# Patient Record
Sex: FEMALE | Race: WHITE | NOT HISPANIC OR LATINO | Employment: OTHER | URBAN - METROPOLITAN AREA
[De-identification: names, ages, dates, MRNs, and addresses within clinical notes are randomized per-mention and may not be internally consistent; named-entity substitution may affect disease eponyms.]

---

## 2017-03-15 PROBLEM — A60.00 GENITAL HERPES: Status: ACTIVE | Noted: 2017-03-15

## 2017-03-15 PROBLEM — Z78.0 POSTMENOPAUSAL: Status: ACTIVE | Noted: 2017-03-15

## 2017-03-20 ENCOUNTER — CLINICAL SUPPORT (OUTPATIENT)
Dept: FAMILY MEDICINE CLINIC | Facility: CLINIC | Age: 62
End: 2017-03-20

## 2017-03-20 DIAGNOSIS — Z78.0 POSTMENOPAUSAL: Primary | ICD-10-CM

## 2017-03-20 DIAGNOSIS — R29.890 HEIGHT LOSS: ICD-10-CM

## 2017-03-20 DIAGNOSIS — M85.80 OSTEOPENIA: ICD-10-CM

## 2017-03-20 PROCEDURE — 77080 DXA BONE DENSITY AXIAL: CPT | Performed by: FAMILY MEDICINE

## 2017-12-06 ENCOUNTER — OFFICE VISIT (OUTPATIENT)
Dept: FAMILY MEDICINE CLINIC | Facility: CLINIC | Age: 62
End: 2017-12-06

## 2017-12-06 VITALS
HEART RATE: 66 BPM | RESPIRATION RATE: 16 BRPM | BODY MASS INDEX: 24.11 KG/M2 | SYSTOLIC BLOOD PRESSURE: 122 MMHG | DIASTOLIC BLOOD PRESSURE: 82 MMHG | HEIGHT: 66 IN | WEIGHT: 150 LBS | TEMPERATURE: 98.3 F | OXYGEN SATURATION: 96 %

## 2017-12-06 DIAGNOSIS — R42 DIZZINESS: Primary | ICD-10-CM

## 2017-12-06 PROBLEM — N36.2 POLYP OF URETHRA: Status: ACTIVE | Noted: 2017-12-06

## 2017-12-06 PROCEDURE — 99213 OFFICE O/P EST LOW 20 MIN: CPT | Performed by: NURSE PRACTITIONER

## 2017-12-06 RX ORDER — ASPIRIN 81 MG/1
TABLET ORAL DAILY
COMMUNITY
Start: 2014-03-07 | End: 2020-07-22

## 2017-12-06 RX ORDER — METHYLPREDNISOLONE 4 MG/1
TABLET ORAL
Qty: 21 TABLET | Refills: 0 | Status: SHIPPED | OUTPATIENT
Start: 2017-12-06 | End: 2017-12-20

## 2017-12-06 RX ORDER — MECLIZINE HYDROCHLORIDE 25 MG/1
25 TABLET ORAL 3 TIMES DAILY PRN
Qty: 21 TABLET | Refills: 0 | Status: SHIPPED | OUTPATIENT
Start: 2017-12-06 | End: 2017-12-20

## 2017-12-06 NOTE — PROGRESS NOTES
Subjective   Sabiha Gonzalez is a 62 y.o. female.     History of Present Illness Sees her provider at work. Has labs annually. Lipids are normal.  [place]. Slept in trendelenburg position. Now when she lays on her left side or moves her eyes too quickly she gets dizzy. Has tried the eply maneuver and has not had any success. No auditory manifestations or nausea. The Eply maneuver does make her nauseated but does not stop the dizziness.  Last seen here 2 years ago for cpx and pap. States her insurance will not pay for an annual wellness visit.    The following portions of the patient's history were reviewed and updated as appropriate: allergies, current medications, past family history, past medical history, past social history, past surgical history and problem list.    Review of Systems   Constitutional: Negative for fatigue, fever and unexpected weight change.   HENT: Negative for congestion, hearing loss, nosebleeds, rhinorrhea, sore throat, trouble swallowing and voice change.    Eyes: Negative for pain, discharge, redness and visual disturbance.   Respiratory: Negative for cough, chest tightness, shortness of breath and wheezing.    Cardiovascular: Negative for chest pain, palpitations and leg swelling.   Gastrointestinal: Negative for abdominal distention, abdominal pain, anal bleeding, blood in stool, constipation, diarrhea, nausea and vomiting.   Endocrine: Negative for cold intolerance, heat intolerance, polydipsia, polyphagia and polyuria.   Genitourinary: Negative for dysuria, flank pain, frequency and hematuria.   Musculoskeletal: Negative for arthralgias, gait problem, joint swelling and myalgias.   Skin: Negative for color change and rash.   Neurological: Positive for dizziness. Negative for tremors, seizures, syncope, speech difficulty, weakness, numbness and headaches.        Vertigo   Hematological: Negative.    Psychiatric/Behavioral: Negative.         Objective   Physical Exam   Constitutional: She is oriented to person, place, and time. She appears well-developed and well-nourished. No distress.   HENT:   Head: Normocephalic and atraumatic.   Right Ear: Tympanic membrane and external ear normal.   Left Ear: Tympanic membrane and external ear normal.   Nose: Nose normal.   Mouth/Throat: Oropharynx is clear and moist. No oropharyngeal exudate.   Eyes: Conjunctivae are normal. Pupils are equal, round, and reactive to light. Right eye exhibits no discharge. Left eye exhibits no discharge. No scleral icterus.   Neck: Neck supple. No tracheal deviation present. No thyromegaly present.   Cardiovascular: Normal rate, regular rhythm and normal heart sounds.  Exam reveals no gallop and no friction rub.    No murmur heard.  Pulmonary/Chest: Effort normal and breath sounds normal. No respiratory distress. She has no wheezes.   Abdominal: Soft. Bowel sounds are normal. She exhibits no distension and no mass. There is no tenderness.   Musculoskeletal: She exhibits no edema or deformity.   Lymphadenopathy:     She has no cervical adenopathy.   Neurological: She is alert and oriented to person, place, and time. She has normal reflexes. She displays normal reflexes. No cranial nerve deficit. She exhibits normal muscle tone. Coordination normal.   No nystagmus   Skin: Skin is warm and dry. No rash noted. No erythema.   Psychiatric: She has a normal mood and affect. Her speech is normal and behavior is normal. Judgment and thought content normal.   Nursing note and vitals reviewed.      Assessment/Plan   Sabiha was seen today for dizziness.    Diagnoses and all orders for this visit:    Dizziness  -     MethylPREDNISolone (MEDROL, ROSLYN,) 4 MG tablet; Take as directed on package instructions.  -     meclizine (ANTIVERT) 25 MG tablet; Take 1 tablet by mouth 3 (Three) Times a Day As Needed for dizziness.    Trial of steroid and meclizine. If symptoms persist will refer to  ent.  Discussed need for annual breast and pelvic exam.   Obtain copy of labs from health screening at work.  Discussed the nature of the disease including, risks, complications, implications, management, safe and proper use of medications. Encouraged keeping scheduled follow up appointments with me and any other providers. Encouraged patient to have appointment for complete physical, fasting labs, appropriate screenings, and immunizations on an annual basis.  Follow up symptoms persist or worsen.

## 2017-12-20 ENCOUNTER — OFFICE VISIT (OUTPATIENT)
Dept: FAMILY MEDICINE CLINIC | Facility: CLINIC | Age: 62
End: 2017-12-20

## 2017-12-20 VITALS
TEMPERATURE: 98.7 F | DIASTOLIC BLOOD PRESSURE: 80 MMHG | HEIGHT: 55 IN | HEART RATE: 64 BPM | OXYGEN SATURATION: 99 % | SYSTOLIC BLOOD PRESSURE: 132 MMHG | BODY MASS INDEX: 34.71 KG/M2 | WEIGHT: 150 LBS | RESPIRATION RATE: 16 BRPM

## 2017-12-20 DIAGNOSIS — Z23 IMMUNIZATION DUE: ICD-10-CM

## 2017-12-20 DIAGNOSIS — R42 VERTIGO: Primary | ICD-10-CM

## 2017-12-20 PROCEDURE — 90715 TDAP VACCINE 7 YRS/> IM: CPT | Performed by: NURSE PRACTITIONER

## 2017-12-20 PROCEDURE — 90471 IMMUNIZATION ADMIN: CPT | Performed by: NURSE PRACTITIONER

## 2017-12-20 PROCEDURE — 99213 OFFICE O/P EST LOW 20 MIN: CPT | Performed by: NURSE PRACTITIONER

## 2017-12-20 NOTE — PROGRESS NOTES
Subjective   Sabiha Gonzalez is a 62 y.o. female.     History of Present Illness Here to follow up on vertigo. Symptoms resolved when she finished the steroids. No nausea or dizziness with movement.  We did not receive her labs or records from her work place medical provider. She does not want to repeat labs at this time. Needs a pelvic.    The following portions of the patient's history were reviewed and updated as appropriate: allergies, current medications, past family history, past medical history, past social history, past surgical history and problem list.    Review of Systems   Constitutional: Negative for fatigue, fever and unexpected weight change.   HENT: Negative for congestion, hearing loss, nosebleeds, rhinorrhea, sore throat, trouble swallowing and voice change.    Eyes: Negative for pain, discharge, redness and visual disturbance.   Respiratory: Negative for cough, chest tightness, shortness of breath and wheezing.    Cardiovascular: Negative for chest pain, palpitations and leg swelling.   Gastrointestinal: Negative for abdominal distention, abdominal pain, anal bleeding, blood in stool, constipation, diarrhea, nausea and vomiting.   Endocrine: Negative for cold intolerance, heat intolerance, polydipsia, polyphagia and polyuria.   Genitourinary: Negative for dysuria, flank pain, frequency and hematuria.   Musculoskeletal: Negative for arthralgias, gait problem, joint swelling and myalgias.   Skin: Negative for color change and rash.   Neurological: Negative for dizziness, tremors, seizures, syncope, speech difficulty, weakness, numbness and headaches.   Hematological: Negative.    Psychiatric/Behavioral: Negative.        Objective   Physical Exam   Constitutional: She is oriented to person, place, and time. She appears well-developed and well-nourished. No distress.   HENT:   Head: Normocephalic and atraumatic.   Right Ear: Tympanic membrane and external ear normal.   Left Ear: Tympanic membrane  and external ear normal.   Nose: Nose normal.   Mouth/Throat: Oropharynx is clear and moist. No oropharyngeal exudate.   Eyes: Conjunctivae are normal. Pupils are equal, round, and reactive to light. Right eye exhibits no discharge. Left eye exhibits no discharge. No scleral icterus.   Neck: Neck supple. No tracheal deviation present. No thyromegaly present.   Cardiovascular: Normal rate, regular rhythm and normal heart sounds.  Exam reveals no gallop and no friction rub.    No murmur heard.  Pulmonary/Chest: Effort normal and breath sounds normal. No respiratory distress. She has no wheezes.   Abdominal: Soft. Bowel sounds are normal. She exhibits no distension and no mass. There is no tenderness.   Musculoskeletal: She exhibits no edema or deformity.   Lymphadenopathy:     She has no cervical adenopathy.   Neurological: She is alert and oriented to person, place, and time. She displays normal reflexes. No cranial nerve deficit. She exhibits normal muscle tone. Coordination normal.   Skin: Skin is warm and dry. No rash noted. No erythema.   Psychiatric: She has a normal mood and affect. Her speech is normal and behavior is normal. Judgment and thought content normal.   Nursing note and vitals reviewed.    Sabiha was seen today for dizziness.    Diagnoses and all orders for this visit:    Vertigo    Immunization due  -     Tdap Vaccine Greater Than or Equal To 8yo IM      Follow up symptoms persist or worsen.  VIS provided.  Discussed the nature of the disease including, risks, complications, implications, management, safe and proper use of medications. Encouraged therapeutic lifestyle changes including low calorie diet with plenty of fruits and vegetables, daily exercise, medication compliance, and keeping scheduled follow up appointments with me and any other providers. Encouraged patient to have appointment for complete physical, fasting labs, appropriate screenings, and immunizations on an annual  basis.

## 2020-07-22 ENCOUNTER — OFFICE VISIT (OUTPATIENT)
Dept: FAMILY MEDICINE CLINIC | Facility: CLINIC | Age: 65
End: 2020-07-22

## 2020-07-22 VITALS
HEIGHT: 66 IN | RESPIRATION RATE: 16 BRPM | HEART RATE: 71 BPM | DIASTOLIC BLOOD PRESSURE: 79 MMHG | WEIGHT: 147 LBS | BODY MASS INDEX: 23.63 KG/M2 | SYSTOLIC BLOOD PRESSURE: 120 MMHG | TEMPERATURE: 97.1 F

## 2020-07-22 DIAGNOSIS — R79.9 ABNORMAL FINDING OF BLOOD CHEMISTRY: ICD-10-CM

## 2020-07-22 DIAGNOSIS — R03.0 ELEVATED BLOOD PRESSURE READING: ICD-10-CM

## 2020-07-22 DIAGNOSIS — E55.9 VITAMIN D DEFICIENCY: ICD-10-CM

## 2020-07-22 DIAGNOSIS — Z85.3 HISTORY OF BREAST CANCER: ICD-10-CM

## 2020-07-22 DIAGNOSIS — Z11.59 NEED FOR HEPATITIS C SCREENING TEST: ICD-10-CM

## 2020-07-22 DIAGNOSIS — Z00.00 MEDICARE ANNUAL WELLNESS VISIT, INITIAL: Primary | ICD-10-CM

## 2020-07-22 PROCEDURE — 93000 ELECTROCARDIOGRAM COMPLETE: CPT | Performed by: NURSE PRACTITIONER

## 2020-07-22 PROCEDURE — 80061 LIPID PANEL: CPT | Performed by: NURSE PRACTITIONER

## 2020-07-22 PROCEDURE — 85025 COMPLETE CBC W/AUTO DIFF WBC: CPT | Performed by: NURSE PRACTITIONER

## 2020-07-22 PROCEDURE — 84443 ASSAY THYROID STIM HORMONE: CPT | Performed by: NURSE PRACTITIONER

## 2020-07-22 PROCEDURE — 80053 COMPREHEN METABOLIC PANEL: CPT | Performed by: NURSE PRACTITIONER

## 2020-07-22 PROCEDURE — 86803 HEPATITIS C AB TEST: CPT | Performed by: NURSE PRACTITIONER

## 2020-07-22 PROCEDURE — G0402 INITIAL PREVENTIVE EXAM: HCPCS | Performed by: NURSE PRACTITIONER

## 2020-07-22 PROCEDURE — 82306 VITAMIN D 25 HYDROXY: CPT | Performed by: NURSE PRACTITIONER

## 2020-07-22 NOTE — PATIENT INSTRUCTIONS
Medicare Wellness  Personal Prevention Plan of Service     Date of Office Visit:  2020  Encounter Provider:  Emmy Velasco, JW, APRN  Place of Service:  [facility]  Patient Name: Sabiha Gonzalez  :  1955    As part of the Medicare Wellness portion of your visit today, we are providing you with this personalized preventive plan of services (PPPS). This plan is based upon recommendations of the [place] Preventive Services Task Force (USPSTF) and the Advisory Committee on Immunization Practices (ACIP).    This lists the preventive care services that should be considered, and provides dates of when you are due. Items listed as completed are up-to-date and do not require any further intervention.    Health Maintenance   Topic Date Due   • ZOSTER VACCINE (2 of 2) 2014   • HEPATITIS C SCREENING  03/15/2017   • MEDICARE ANNUAL WELLNESS  03/15/2017   • Pneumococcal Vaccine Once at 65 Years Old  2018   • INFLUENZA VACCINE  2020   • MAMMOGRAM  05/10/2021   • COLONOSCOPY  2027   • TDAP/TD VACCINES (2 - Td) 2027       Orders Placed This Encounter   Procedures   • Comprehensive Metabolic Panel   • Vitamin D 25 Hydroxy   • Hepatitis C Antibody   • Lipid Panel   • TSH   • CBC Auto Differential   • CBC & Differential     Order Specific Question:   Manual Differential     Answer:   No       Return if symptoms worsen or fail to improve.

## 2020-07-22 NOTE — PROGRESS NOTES
The ABCs of the Annual Wellness Visit  Initial Medicare Wellness Visit    Chief Complaint   Patient presents with   • Annual Exam   • Hypertension       Subjective   History of Present Illness:  Sabiha Gonzalez is a 64 y.o. female who presents for an Initial Medicare Wellness Visit.    HEALTH RISK ASSESSMENT    Recent Hospitalizations:  No hospitalization(s) within the last year.    Current Medical Providers:  Patient Care Team:  Emmy Velasco, JW, APRN as PCP - General    Smoking Status:  Social History     Tobacco Use   Smoking Status Former Smoker   Smokeless Tobacco Never Used       Alcohol Consumption:  Social History     Substance and Sexual Activity   Alcohol Use Yes    Comment: occasional        Depression Screen:   PHQ-2/PHQ-9 Depression Screening 7/22/2020   Little interest or pleasure in doing things 0   Feeling down, depressed, or hopeless 0   Total Score 0       Fall Risk Screen:  RUELADI Fall Risk Assessment has not been completed.    Health Habits and Functional and Cognitive Screening:  Functional & Cognitive Status 7/22/2020   Do you have difficulty preparing food and eating? No   Do you have difficulty bathing yourself, getting dressed or grooming yourself? No   Do you have difficulty using the toilet? No   Do you have difficulty moving around from place to place? No   Do you have trouble with steps or getting out of a bed or a chair? Yes   Current Diet Well Balanced Diet   Dental Exam Up to date   Eye Exam Up to date   Exercise (times per week) 7 times per week   Current Exercise Activities Include Walking   Do you need help using the phone?  No   Are you deaf or do you have serious difficulty hearing?  No   Do you need help with transportation? No   Do you need help shopping? No   Do you need help preparing meals?  No   Do you need help with housework?  No   Do you need help with laundry? No   Do you need help taking your medications? No   Do you need help managing money? No   Do you  ever drive or ride in a car without wearing a seat belt? No   Have you felt unusual stress, anger or loneliness in the last month? No   Who do you live with? Alone   If you need help, do you have trouble finding someone available to you? No   Have you been bothered in the last four weeks by sexual problems? No   Do you have difficulty concentrating, remembering or making decisions? Yes     ACE MINI III  What is the year: correct  What is the month of the year: correct  What is the date?: correct  Repeat address three times, only score third attempt: [address], [place]: 7  Verbal Fluency -- Animal Names (0-25): 22+  Clock Drawing: All Correct  Tell me what you remember about that name and address we were repeating at the beginnin  Total ACE Score - <25/30 strongly suggests cognitive impairment; <21/30 almost certainly shows dementia: 30       Does the patient have evidence of cognitive impairment? No    Asprin use counseling:Does not need ASA (and currently is not on it)    Age-appropriate Screening Schedule:  Refer to the list below for future screening recommendations based on patient's age, sex and/or medical conditions. Orders for these recommended tests are listed in the plan section. The patient has been provided with a written plan.    Health Maintenance   Topic Date Due   • ZOSTER VACCINE (2 of 2) 2014   • INFLUENZA VACCINE  2020   • MAMMOGRAM  05/10/2021   • COLONOSCOPY  2027   • TDAP/TD VACCINES (2 - Td) 2027          The following portions of the patient's history were reviewed and updated as appropriate: allergies, current medications, past family history, past medical history, past social history, past surgical history and problem list.    Outpatient Medications Prior to Visit   Medication Sig Dispense Refill   • Multiple Vitamin (MULTI VITAMIN) tablet Take  by mouth.     • aspirin 81 MG EC tablet Take  by mouth Daily.       No  facility-administered medications prior to visit.        Patient Active Problem List   Diagnosis   • Postmenopausal   • Genital herpes   • Polyp of urethra   • Breast cancer (CMS/HCC)   • Leg fracture, left       Advanced Care Planning:  ACP discussion was held with the patient during this visit. Patient does not have an advance directive, information provided.    Review of Systems   Constitutional: Negative for fatigue, fever and unexpected weight change.   HENT: Negative for congestion, hearing loss, nosebleeds, rhinorrhea, sore throat, trouble swallowing and voice change.    Eyes: Negative for pain, discharge, redness and visual disturbance.   Respiratory: Negative for cough, chest tightness, shortness of breath and wheezing.    Cardiovascular: Negative for chest pain, palpitations and leg swelling.   Gastrointestinal: Negative for abdominal distention, abdominal pain, anal bleeding, blood in stool, constipation, diarrhea, nausea and vomiting.   Endocrine: Negative for cold intolerance, heat intolerance, polydipsia, polyphagia and polyuria.   Genitourinary: Negative for dysuria, flank pain, frequency and hematuria.   Musculoskeletal: Positive for arthralgias. Negative for gait problem, joint swelling and myalgias.   Skin: Negative for color change and rash.   Neurological: Negative for dizziness, tremors, seizures, syncope, speech difficulty, weakness, numbness and headaches.   Hematological: Negative.    Psychiatric/Behavioral: Negative.        Compared to one year ago, the patient feels her physical health is the same.  Compared to one year ago, the patient feels her mental health is the same.    Reviewed chart for potential of high risk medication in the elderly: yes  Reviewed chart for potential of harmful drug interactions in the elderly:yes    Objective         Vitals:    07/22/20 0951 07/22/20 1320   BP: 120/79    Pulse: 71    Resp: 16    Temp: 97.1 °F (36.2 °C)    TempSrc: Temporal    Weight: 66.7 kg (147  "lb) 66.7 kg (147 lb)   Height: 65.8 cm (25.89\") 167.1 cm (65.8\")       Body mass index is 23.87 kg/m².  Discussed the patient's BMI with her. The BMI is in the acceptable range.    Physical Exam   Constitutional: She is oriented to person, place, and time. She appears well-developed and well-nourished. No distress.   HENT:   Head: Normocephalic and atraumatic.   Right Ear: Tympanic membrane and external ear normal.   Left Ear: Tympanic membrane and external ear normal.   Nose: Nose normal.   Mouth/Throat: Oropharynx is clear and moist. No oropharyngeal exudate.   Eyes: Pupils are equal, round, and reactive to light. Conjunctivae are normal. Right eye exhibits no discharge. Left eye exhibits no discharge. No scleral icterus.   Neck: Neck supple. No tracheal deviation present. No thyromegaly present.   Cardiovascular: Normal rate, regular rhythm and normal heart sounds. Exam reveals no gallop and no friction rub.   No murmur heard.  Pulmonary/Chest: Effort normal and breath sounds normal. No respiratory distress. She has no wheezes.   Abdominal: Soft. Bowel sounds are normal. She exhibits no distension and no mass. There is no tenderness.   Musculoskeletal: She exhibits no edema or deformity.   Lymphadenopathy:     She has no cervical adenopathy.   Neurological: She is alert and oriented to person, place, and time. Coordination normal.   Skin: Skin is warm and dry. Capillary refill takes less than 2 seconds. No rash noted. No erythema.   Psychiatric: She has a normal mood and affect. Her speech is normal and behavior is normal. Judgment and thought content normal.   Nursing note and vitals reviewed.          ECG 12 Lead  Date/Time: 7/22/2020 1:24 PM  Performed by: Emmy Velasco DNP, APRN  Authorized by: Emmy Velasco DNP, JAY   Comparison: not compared with previous ECG   Previous ECG: no previous ECG available  Rhythm: sinus bradycardia  Rate: bradycardic  BPM: 54  Conduction: conduction normal  ST " Segments: ST segments normal  QRS axis: normal    Clinical impression: abnormal EKG                  Assessment/Plan    Sabiha was seen today for annual exam and hypertension.    Diagnoses and all orders for this visit:    Medicare annual wellness visit, initial  -     Lipid Panel  -     TSH    Need for hepatitis C screening test  -     Hepatitis C Antibody    Elevated blood pressure reading  -     ECG 12 Lead    History of breast cancer  -     CBC & Differential  -     Comprehensive Metabolic Panel  -     CBC Auto Differential    Vitamin D deficiency  -     Vitamin D 25 Hydroxy    Abnormal finding of blood chemistry          Medicare Risks and Personalized Health Plan  CMS Preventative Services Quick Reference  Advance Directive Discussion  Breast Cancer/Mammogram Screening  Cardiovascular risk  Colon Cancer Screening  Dementia/Memory   Depression/Dysphoria  Diabetic Lab Screening   Immunizations Discussed/Encouraged (specific immunizations; none-she will bring record of previous immunizations )  Osteoprorosis Risk    The above risks/problems have been discussed with the patient.  Pertinent information has been shared with the patient in the After Visit Summary.  Follow up plans and orders are seen below in the Assessment/Plan Section.      Follow Up:  Return if symptoms worsen or fail to improve.     An After Visit Summary and PPPS were given to the patient.

## 2020-07-22 NOTE — PROGRESS NOTES
Subjective   Sabiha Gonzalez is a 64 y.o. female and is here for a comprehensive physical exam. The patient reports problems - recent orthopedic injury. Patient reports last physical date of 2017.  Has had some elevated blood pressures at other office visits, but it is always normal at home. No headache, chest pain or edema      Dr Francis Cooper-Oncology [facility]    Patient rates their health as good. Describes diet as Well Balanced Diet. Exercises left leg injury. Walks daily. Occasional pain. Employed retired. Lives with alone. Dental exam every 6 months-yes. Brushes teeth twice a day-yes. Vision exam in last 12 months-yes. Wears glasses.    Do you take any herbs or supplements that were not prescribed by a doctor? no  Are you taking aspirin daily? no  Family history of ovarian cancer? no  Family history of breast cancer? yes  FH of endometrial cancer? no  FH of cervical cancer? no  FH of colon cancer? no    Cancer Screening  Mammogram up-to-date?  yes  BMD up-to-date? yes  Colonoscopy up-to-date? yes      History:  LMP: No LMP recorded. Patient is postmenopausal.  Post menopausal.  Last pap date:   Abnormal pap? no  : 4  Para: 2  Method of birth control sexually active and no birth control needed    Immunization History  Tdap? yes  HPV? not applicable  Pneumonia? has had one  Shingles? yes   Hep A    The following portions of the patient's history were reviewed and updated as appropriate: allergies, current medications, past family history, past medical history, past social history, past surgical history and problem list.    Past Medical History:   Diagnosis Date   • Breast cancer (CMS/[facility])    • Contact dermatitis    • Genital herpes    • Urethral polyp        Family History   Problem Relation Age of Onset   • Heart disease Mother    • Diabetes Mother    • Breast cancer Mother    • Stroke Father    • Diabetes Brother    • Heart disease Brother        Past Surgical History:   Procedure Laterality  Date   • KNEE SURGERY Right    • MASTECTOMY     • TONSILLECTOMY         Social History     Socioeconomic History   • Marital status:      Spouse name: Not on file   • Number of children: Not on file   • Years of education: Not on file   • Highest education level: Not on file   Tobacco Use   • Smoking status: Former Smoker   • Smokeless tobacco: Never Used   Substance and Sexual Activity   • Alcohol use: Yes     Comment: occasional    • Drug use: No   • Sexual activity: Not Currently       Review of Systems  Do you have pain that bothers you in your daily life? yes  Review of Systems   Constitutional: Negative for fatigue, fever and unexpected weight change.   HENT: Negative for congestion, hearing loss, nosebleeds, rhinorrhea, sore throat, trouble swallowing and voice change.    Eyes: Negative for pain, discharge, redness and visual disturbance.   Respiratory: Negative for cough, chest tightness, shortness of breath and wheezing.    Cardiovascular: Positive for palpitations. Negative for chest pain and leg swelling.        Palpitations for a few seconds.   Gastrointestinal: Negative for abdominal distention, abdominal pain, anal bleeding, blood in stool, constipation, diarrhea, nausea and vomiting.   Endocrine: Negative for cold intolerance, heat intolerance, polydipsia, polyphagia and polyuria.   Genitourinary: Negative for dysuria, flank pain, frequency and hematuria.   Musculoskeletal: Positive for arthralgias, gait problem and joint swelling. Negative for myalgias.   Skin: Negative for color change and rash.        Has Derm appt next week   Neurological: Negative for dizziness, tremors, seizures, syncope, speech difficulty, weakness, numbness and headaches.        Vertigo in the past   Hematological: Negative.    Psychiatric/Behavioral: Negative.        Objective   Physical Exam   Constitutional: She is oriented to person, place, and time. She appears well-developed and well-nourished. No distress.    HENT:   Head: Normocephalic and atraumatic.   Right Ear: Tympanic membrane and external ear normal.   Left Ear: Tympanic membrane and external ear normal.   Nose: Nose normal.   Mouth/Throat: Oropharynx is clear and moist. No oropharyngeal exudate.   Eyes: Pupils are equal, round, and reactive to light. Conjunctivae are normal. Right eye exhibits no discharge. Left eye exhibits no discharge. No scleral icterus.   Neck: Neck supple. No tracheal deviation present. No thyromegaly present.   Cardiovascular: Normal rate, regular rhythm and normal heart sounds. Exam reveals no gallop and no friction rub.   No murmur heard.  Pulmonary/Chest: Effort normal and breath sounds normal. No respiratory distress. She has no wheezes.   Abdominal: Soft. Bowel sounds are normal. She exhibits no distension and no mass. There is no tenderness.   Musculoskeletal: She exhibits no edema or deformity.   Lymphadenopathy:     She has no cervical adenopathy.   Neurological: She is alert and oriented to person, place, and time. Coordination normal.   Skin: Skin is warm and dry. Capillary refill takes less than 2 seconds. No rash noted. No erythema.   Psychiatric: She has a normal mood and affect. Her speech is normal and behavior is normal. Judgment and thought content normal.   Nursing note and vitals reviewed.     Sabiha was seen today for annual exam and hypertension.    Diagnoses and all orders for this visit:    Medicare annual wellness visit, initial  -     Lipid Panel  -     TSH    Need for hepatitis C screening test  -     Hepatitis C Antibody    Elevated blood pressure reading  -     ECG 12 Lead    History of breast cancer  -     CBC & Differential  -     Comprehensive Metabolic Panel  -     CBC Auto Differential    Vitamin D deficiency  -     Vitamin D 25 Hydroxy    Abnormal finding of blood chemistry        1. Will follow up with labs.  2. Patient Counseling:  --Nutrition: Stressed importance of moderation in sodium/caffeine  intake, saturated fat and cholesterol, caloric balance, sufficient intake of fresh fruits, vegetables, fiber, calcium,--Exercise: Stressed the importance of exercise 5 times a week  --Substance Abuse: Discussed cessation/primary prevention of tobacco, alcohol, or other drug use; driving or other dangerous activities under the influence; availability of treatment for abuse.    --Injury prevention: Discussed safety belts, safety helmets, smoke detector, smoking near bedding or upholstery.   --Dental health: Discussed importance of regular tooth brushing, flossing, and dental visits.  --Immunizations reviewed.  --Discussed benefits of screening colonoscopy.  --Discussed benefits of screening mammogram.  --After hours service discussed with patient, and appropriate use of the ER.  --Discussed the importance of medication compliance, follow up with me and other health care providers, annual physical, fasting labs, and age appropriate screenings.    3. Discussed the patient's BMI with her.  The BMI is in the acceptable range  4. Follow up in one year  5. Discussed the nature of the disease including, risks, complications, implications, management, safe and proper use of medications. Encouraged therapeutic lifestyle changes including low calorie diet with plenty of fruits and vegetables, daily exercise, medication compliance, and keeping scheduled follow up appointments with me and any other providers. Encouraged patient to have appointment for complete physical, fasting labs, appropriate screenings, and immunizations on an annual basis.

## 2020-07-23 LAB
25(OH)D3 SERPL-MCNC: 45.4 NG/ML (ref 30–100)
ALBUMIN SERPL-MCNC: 4.3 G/DL (ref 3.5–5.2)
ALBUMIN/GLOB SERPL: 1.5 G/DL
ALP SERPL-CCNC: 50 U/L (ref 39–117)
ALT SERPL W P-5'-P-CCNC: 14 U/L (ref 1–33)
ANION GAP SERPL CALCULATED.3IONS-SCNC: 11.8 MMOL/L (ref 5–15)
AST SERPL-CCNC: 17 U/L (ref 1–32)
BASOPHILS # BLD AUTO: 0.04 10*3/MM3 (ref 0–0.2)
BASOPHILS NFR BLD AUTO: 0.6 % (ref 0–1.5)
BILIRUB SERPL-MCNC: 0.4 MG/DL (ref 0–1.2)
BUN SERPL-MCNC: 12 MG/DL (ref 8–23)
BUN/CREAT SERPL: 19.4 (ref 7–25)
CALCIUM SPEC-SCNC: 9.5 MG/DL (ref 8.6–10.5)
CHLORIDE SERPL-SCNC: 103 MMOL/L (ref 98–107)
CHOLEST SERPL-MCNC: 217 MG/DL (ref 0–200)
CO2 SERPL-SCNC: 21.2 MMOL/L (ref 22–29)
CREAT SERPL-MCNC: 0.62 MG/DL (ref 0.57–1)
DEPRECATED RDW RBC AUTO: 41.2 FL (ref 37–54)
EOSINOPHIL # BLD AUTO: 0.18 10*3/MM3 (ref 0–0.4)
EOSINOPHIL NFR BLD AUTO: 2.8 % (ref 0.3–6.2)
ERYTHROCYTE [DISTWIDTH] IN BLOOD BY AUTOMATED COUNT: 13.2 % (ref 12.3–15.4)
GFR SERPL CREATININE-BSD FRML MDRD: 96 ML/MIN/1.73
GLOBULIN UR ELPH-MCNC: 2.9 GM/DL
GLUCOSE SERPL-MCNC: 82 MG/DL (ref 65–99)
HCT VFR BLD AUTO: 40.1 % (ref 34–46.6)
HCV AB SER DONR QL: NORMAL
HDLC SERPL-MCNC: 97 MG/DL (ref 40–60)
HGB BLD-MCNC: 12.8 G/DL (ref 12–15.9)
IMM GRANULOCYTES # BLD AUTO: 0.02 10*3/MM3 (ref 0–0.05)
IMM GRANULOCYTES NFR BLD AUTO: 0.3 % (ref 0–0.5)
LDLC SERPL CALC-MCNC: 107 MG/DL (ref 0–100)
LDLC/HDLC SERPL: 1.1 {RATIO}
LYMPHOCYTES # BLD AUTO: 2.52 10*3/MM3 (ref 0.7–3.1)
LYMPHOCYTES NFR BLD AUTO: 39.2 % (ref 19.6–45.3)
MCH RBC QN AUTO: 26.9 PG (ref 26.6–33)
MCHC RBC AUTO-ENTMCNC: 31.9 G/DL (ref 31.5–35.7)
MCV RBC AUTO: 84.4 FL (ref 79–97)
MONOCYTES # BLD AUTO: 0.64 10*3/MM3 (ref 0.1–0.9)
MONOCYTES NFR BLD AUTO: 10 % (ref 5–12)
NEUTROPHILS NFR BLD AUTO: 3.03 10*3/MM3 (ref 1.7–7)
NEUTROPHILS NFR BLD AUTO: 47.1 % (ref 42.7–76)
NRBC BLD AUTO-RTO: 0 /100 WBC (ref 0–0.2)
PLATELET # BLD AUTO: 315 10*3/MM3 (ref 140–450)
PMV BLD AUTO: 9.5 FL (ref 6–12)
POTASSIUM SERPL-SCNC: 4.3 MMOL/L (ref 3.5–5.2)
PROT SERPL-MCNC: 7.2 G/DL (ref 6–8.5)
RBC # BLD AUTO: 4.75 10*6/MM3 (ref 3.77–5.28)
SODIUM SERPL-SCNC: 136 MMOL/L (ref 136–145)
TRIGL SERPL-MCNC: 67 MG/DL (ref 0–150)
TSH SERPL DL<=0.05 MIU/L-ACNC: 2.29 UIU/ML (ref 0.27–4.2)
VLDLC SERPL-MCNC: 13.4 MG/DL (ref 5–40)
WBC # BLD AUTO: 6.43 10*3/MM3 (ref 3.4–10.8)

## 2020-08-04 ENCOUNTER — TELEPHONE (OUTPATIENT)
Dept: FAMILY MEDICINE CLINIC | Facility: CLINIC | Age: 65
End: 2020-08-04

## 2020-08-04 NOTE — TELEPHONE ENCOUNTER
We changed her visit to a medicare wellness and now it's showing she doesn't have insurance. Can you take care of this?

## 2020-08-04 NOTE — TELEPHONE ENCOUNTER
PT CALLED TO REQUEST A CALL BACK REGARDING HER LAST APPT WITH PCP.    JERICHO WARE.  CALL BACK:[phone]

## 2020-08-04 NOTE — TELEPHONE ENCOUNTER
PATIENT CALLED AGAIN AND WANTED TO LEAVE A MESSAGE FOR RAFAELA.  VISIT ON July VISIT ISN'T BEING PAID, AND SHE NEEDS TO GET IT SORTED OUT.  THERE IS AN ISSUE WITH HOW THE VISIT WAS SCHEDULED AND/OR CODED.      PLEASE CONTACT PATIENT TO ADVISE.  CALLBACK [phone]

## 2021-02-12 ENCOUNTER — OFFICE VISIT (OUTPATIENT)
Dept: FAMILY MEDICINE CLINIC | Facility: CLINIC | Age: 66
End: 2021-02-12

## 2021-02-12 VITALS
RESPIRATION RATE: 22 BRPM | HEART RATE: 75 BPM | WEIGHT: 142 LBS | OXYGEN SATURATION: 99 % | BODY MASS INDEX: 23.66 KG/M2 | TEMPERATURE: 98.2 F | SYSTOLIC BLOOD PRESSURE: 124 MMHG | HEIGHT: 65 IN | DIASTOLIC BLOOD PRESSURE: 82 MMHG

## 2021-02-12 DIAGNOSIS — R51.9 ACUTE INTRACTABLE HEADACHE, UNSPECIFIED HEADACHE TYPE: Primary | ICD-10-CM

## 2021-02-12 DIAGNOSIS — Z85.3 PERSONAL HISTORY OF BREAST CANCER: ICD-10-CM

## 2021-02-12 PROBLEM — A60.00 GENITAL HERPES: Chronic | Status: ACTIVE | Noted: 2017-03-15

## 2021-02-12 PROCEDURE — 99213 OFFICE O/P EST LOW 20 MIN: CPT | Performed by: NURSE PRACTITIONER

## 2021-02-12 NOTE — PROGRESS NOTES
Follow Up Office Note     Patient Name: Sabiha Gonzalez  : 1955   MRN: [id number]     Chief Complaint:    Chief Complaint   Patient presents with   • Headache       History of Present Illness: Sabiha Gonzalez is a 65 y.o. female who presents today with complaint of left sided head pain x1 month.  Patient states that the pain began after she was working on a pneumatic tensioner for her storm door.  She states that while attempting to install the device she had been straining and exerting herself significantly.  She states the pain started at this time and has persisted since.  Patient has a history of breast cancer and is concerned that she could have a metastasis to the brain or have some type of aneurysm.  She is requesting diagnostic imaging regarding this.    Headache   This is a new (1 month ago) problem. The problem occurs daily. The problem has been unchanged. The pain is located in the left unilateral region. The pain does not radiate. The pain quality is not similar to prior headaches. The quality of the pain is described as throbbing. The pain is moderate. Pertinent negatives include no abdominal pain, abnormal behavior, back pain, blurred vision, coughing, dizziness, ear pain, eye pain, fever, hearing loss, loss of balance, nausea, neck pain, numbness, phonophobia, photophobia, rhinorrhea, scalp tenderness, seizures, sinus pressure, sore throat, swollen glands, tingling, tinnitus, visual change, vomiting or weakness. Nothing aggravates the symptoms. She has tried NSAIDs for the symptoms. The treatment provided mild relief.        Subjective      Review of Systems:   Review of Systems   Constitutional: Negative for activity change, appetite change, chills, diaphoresis, fatigue, fever and unexpected weight change.   HENT: Negative for congestion, dental problem, ear discharge, ear pain, facial swelling, hearing loss, nosebleeds, postnasal drip, rhinorrhea, sinus pressure, sinus pain,  "sore throat, tinnitus, trouble swallowing and voice change.    Eyes: Negative for blurred vision, photophobia and pain.   Respiratory: Negative for cough, chest tightness, shortness of breath and wheezing.    Cardiovascular: Negative for chest pain, palpitations and leg swelling.   Gastrointestinal: Negative for abdominal pain, diarrhea, nausea and vomiting.   Genitourinary: Negative.    Musculoskeletal: Negative for back pain, myalgias and neck pain.   Skin: Negative.    Neurological: Positive for headaches. Negative for dizziness, tingling, tremors, seizures, syncope, facial asymmetry, speech difficulty, weakness, light-headedness, numbness and loss of balance.        Past Medical History:   Past Medical History:   Diagnosis Date   • Breast cancer (CMS/HCC)    • Contact dermatitis    • Genital herpes    • Leg fracture, left    • Urethral polyp          Medications:     Current Outpatient Medications:   •  Multiple Vitamin (MULTI VITAMIN) tablet, Take  by mouth., Disp: , Rfl:     Allergies:   No Known Allergies      Objective     Physical Exam:  Vital Signs:   Vitals:    02/12/21 1105   BP: 124/82   BP Location: Left arm   Patient Position: Sitting   Cuff Size: Adult   Pulse: 75   Resp: 22   Temp: 98.2 °F (36.8 °C)   SpO2: 99%   Weight: 64.4 kg (142 lb)   Height: 165.1 cm (65\")   PainSc:   3     Body mass index is 23.63 kg/m².     Physical Exam  Vitals signs and nursing note reviewed.   Constitutional:       General: She is not in acute distress.     Appearance: Normal appearance. She is well-developed and well-groomed. She is not ill-appearing, toxic-appearing or diaphoretic.   HENT:      Head: Normocephalic and atraumatic.   Eyes:      Pupils: Pupils are equal, round, and reactive to light.   Cardiovascular:      Rate and Rhythm: Normal rate and regular rhythm.      Heart sounds: Normal heart sounds.   Pulmonary:      Effort: Pulmonary effort is normal. No respiratory distress.      Breath sounds: Normal breath " sounds. No stridor. No wheezing.   Skin:     General: Skin is warm and dry.   Neurological:      General: No focal deficit present.      Mental Status: She is alert and oriented to person, place, and time.      Cranial Nerves: Cranial nerves are intact.      Sensory: Sensation is intact.      Motor: Motor function is intact.      Coordination: Coordination is intact.      Gait: Gait is intact.   Psychiatric:         Mood and Affect: Mood normal.         Behavior: Behavior normal. Behavior is cooperative.         Thought Content: Thought content normal.         Judgment: Judgment normal.         Assessment / Plan      Assessment/Plan:   Diagnoses and all orders for this visit:    1. Acute intractable headache, unspecified headache type (Primary)  -     CT Head Without Contrast; Future    *Diagnostic imaging ordered, further recommendations after CT evaluation.     Discussed the nature of the medical condition(s) risks, complications, implications, management, safe and proper use of medications. Encouraged medication compliance, and keeping scheduled follow up appointments with me and any other providers.      I spent 20 minutes caring for Sabiha on this date of service. This time includes time spent by me in the following activities:preparing for the visit, reviewing tests, performing a medically appropriate examination and/or evaluation , counseling and educating the patient/family/caregiver, ordering medications, tests, or procedures and documenting information in the medical record.    RTC if symptoms fail to improve, to ER if symptoms worsen.      JAY Pinzon  [facility] [place]       Please note that portions of this note may have been completed with a voice recognition program. Efforts were made to edit the dictations, but occasionally words are mistranscribed.

## 2021-02-12 NOTE — PATIENT INSTRUCTIONS
General Headache Without Cause  A headache is pain or discomfort felt around the head or neck area. The specific cause of a headache may not be found. There are many causes and types of headaches. A few common ones are:  · Tension headaches.  · Migraine headaches.  · Cluster headaches.  · Chronic daily headaches.  Follow these instructions at home:  Watch your condition for any changes. Let your health care provider know about them. Take these steps to help with your condition:  Managing pain         · Take over-the-counter and prescription medicines only as told by your health care provider.  · Lie down in a dark, quiet room when you have a headache.  · If directed, put ice on your head and neck area:  ? Put ice in a plastic bag.  ? Place a towel between your skin and the bag.  ? Leave the ice on for 20 minutes, 2-3 times per day.  · If directed, apply heat to the affected area. Use the heat source that your health care provider recommends, such as a moist heat pack or a heating pad.  ? Place a towel between your skin and the heat source.  ? Leave the heat on for 20-30 minutes.  ? Remove the heat if your skin turns bright red. This is especially important if you are unable to feel pain, heat, or cold. You may have a greater risk of getting burned.  · Keep lights dim if bright lights bother you or make your headaches worse.  Eating and drinking  · Eat meals on a regular schedule.  · If you drink alcohol:  ? Limit how much you use to:  § 0-1 drink a day for women.  § 0-2 drinks a day for men.  ? Be aware of how much alcohol is in your drink. In the [place] one drink equals one 12 oz bottle of beer (355 mL), one 5 oz glass of wine (148 mL), or one 1½ oz glass of hard liquor (44 mL).  · Stop drinking caffeine, or decrease the amount of caffeine you drink.  General instructions    · Keep a headache journal to help find out what may trigger your headaches. For example, write down:  ? What you eat and drink.  ? How much  sleep you get.  ? Any change to your diet or medicines.  · Try massage or other relaxation techniques.  · Limit stress.  · Sit up straight, and do not tense your muscles.  · Do not use any products that contain nicotine or tobacco, such as cigarettes, e-cigarettes, and chewing tobacco. If you need help quitting, ask your health care provider.  · Exercise regularly as told by your health care provider.  · Sleep on a regular schedule. Get 7-9 hours of sleep each night, or the amount recommended by your health care provider.  · Keep all follow-up visits as told by your health care provider. This is important.  Contact a health care provider if:  · Your symptoms are not helped by medicine.  · You have a headache that is different from the usual headache.  · You have nausea or you vomit.  · You have a fever.  Get help right away if:  · Your headache becomes severe quickly.  · Your headache gets worse after moderate to intense physical activity.  · You have repeated vomiting.  · You have a stiff neck.  · You have a loss of vision.  · You have problems with speech.  · You have pain in the eye or ear.  · You have muscular weakness or loss of muscle control.  · You lose your balance or have trouble walking.  · You feel faint or pass out.  · You have confusion.  · You have a seizure.  Summary  · A headache is pain or discomfort felt around the head or neck area.  · There are many causes and types of headaches. In some cases, the cause may not be found.  · Keep a headache journal to help find out what may trigger your headaches. Watch your condition for any changes. Let your health care provider know about them.  · Contact a health care provider if you have a headache that is different from the usual headache, or if your symptoms are not helped by medicine.  · Get help right away if your headache becomes severe, you vomit, you have a loss of vision, you lose your balance, or you have a seizure.  This information is not  intended to replace advice given to you by your health care provider. Make sure you discuss any questions you have with your health care provider.  Document Revised: 07/08/2019 Document Reviewed: 07/08/2019  [employer] Patient Education © 2020 [employer]    Breast Cancer, Female    Breast cancer is a malignant growth of tissue (tumor) in the breast. Unlike noncancerous (benign) tumors, malignant tumors are cancerous and can spread to other parts of the body. The two most common types of breast cancer start in the milk ducts (ductal carcinoma) or in the lobules where milk is made in the breast (lobular carcinoma). Breast cancer is one of the most common types of cancer in women.  What are the causes?  The exact cause of female breast cancer is unknown.  What increases the risk?  The following factors may make you more likely to develop this condition:  · Being older than 55 years of age.  · Race and ethnicity.  women generally have an increased risk, but -American women are more likely to develop the disease before age 45.  · Having a family history of breast cancer.  · Having had breast cancer in the past.  · Having certain noncancerous conditions of the breast, such as dense breast tissue.  · Having the BRCA1 and BRCA2 genes.  · Having a history of radiation exposure.  · Obesity.  · Starting menopause after age 55.  · Starting your menstrual periods before age 12.  · Having never been pregnant or having your first child after age 30.  · Having never .  · Using hormone therapy after menopause.  · Using birth control pills.  · Drinking more than one alcoholic drink a day.  · Exposure to the drug CARLOS ALBERTO, which was given to pregnant women from the 1940s to the 1970s.  What are the signs or symptoms?  Symptoms of this condition include:  · A painless lump or thickening in your breast.  · Changes in the size or shape of your breast.  · Breast skin changes, such as puckering or dimpling.  · Nipple  abnormalities, such as scaling, crustiness, redness, or pulling in (retraction).  · Nipple discharge that is bloody or clear.  How is this diagnosed?  This condition may be diagnosed by:  · Taking your medical history and doing a physical exam. During the exam, your health care provider will feel the tissue around your breast and under your arms.  · Taking a sample of nipple discharge. The sample will be examined under a microscope.  · Performing imaging tests, such as breast X-rays (mammogram), breast ultrasound exams, or an MRI.  · Taking a tissue sample (biopsy) from the breast. The sample will be examined under a microscope to look for cancer cells.  · Taking a sample from the lymph nodes near the affected breast (sentinel node biopsy).  Your cancer will be staged to determine its severity and extent. Staging is a careful attempt to find out the size of the tumor, whether the cancer has spread, and if so, to what parts of the body. Staging also includes testing your tumor for certain receptors, such as estrogen, progesterone, and human epidermal growth factor receptor 2 (HER2). This will help your cancer care team decide on a treatment that will work best for you. You may need to have more tests to determine the stage of your cancer. Stages include the following:  · Stage 0--The tumor has not spread to other breast tissue.  · Stage I--The cancer is only found in the breast or may be in the lymph nodes. The tumor may be up to ¾ in (2 cm) wide.  · Stage II--The cancer has spread to nearby lymph nodes. The tumor may be up to 2 in (5 cm) wide.  · Stage III--The cancer has spread to more distant lymph nodes. The tumor may be larger than 2 in (5 cm) wide.  · Stage IV--The cancer has spread to other parts of the body, such as the bones, brain, liver, or lungs.  How is this treated?  Treatment for this condition depends on the type and stage of the breast cancer. It may be treated with:  · Surgery. This may involve  breast-conserving surgery (lumpectomy or partial mastectomy) in which only the part of the breast containing the cancer is removed. Some normal tissue surrounding this area may also be removed. In some cases, surgery may be done to remove the entire breast (mastectomy) and nipple. Lymph nodes may also be removed.  · Radiation therapy, which uses high-energy rays to kill cancer cells.  · Chemotherapy, which is the use of drugs to kill cancer cells.  · Hormone therapy, which involves taking medicine to adjust the hormone levels in your body. You may take medicine to decrease your estrogen levels. This can help stop cancer cells from growing.  · Targeted therapy, in which drugs are used to block the growth and spread of cancer cells. These drugs target a specific part of the cancer cell and usually cause fewer side effects than chemotherapy. Targeted therapy may be used alone or in combination with chemotherapy.  · A combination of surgery, radiation, chemotherapy, or hormone therapy may be needed to treat breast cancer.  Follow these instructions at home:  · Take over-the-counter and prescription medicines only as told by your health care provider.  · Eat a healthy diet. A healthy diet includes lots of fruits and vegetables, low-fat dairy products, lean meats, and fiber.  ? Make sure half your plate is filled with fruits or vegetables.  ? Choose high-fiber foods such as whole-grain breads and cereals.  · Consider joining a support group. This may help you learn to cope with the stress of having breast cancer.  · Talk to your health care team about exercise and physical activity. The right exercise program can:  ? Help prevent or reduce symptoms such as fatigue or depression.  ? Improve overall health and survival rates.  · Keep all follow-up visits as told by your health care provider. This is important.  Where to find more information  · American Cancer Society: www.cancer.org  · National Cancer [place]:  www.cancer.gov  Contact a health care provider if:  · You have a sudden increase in pain.  · You have any symptoms or changes that concern you.  · You lose weight without trying.  · You notice a new lump in either breast or under your arm.  · You develop swelling in either arm or hand.  · You have a fever.  · You notice new fatigue or weakness.  Get help right away if:  · You have chest pain or trouble breathing.  · You faint.  Summary  · Breast cancer is a malignant growth of tissue (tumor) in the breast.  · Your cancer will be staged to determine its severity and extent.  · Treatment for this condition depends on the type and stage of the breast cancer.  This information is not intended to replace advice given to you by your health care provider. Make sure you discuss any questions you have with your health care provider.  Document Revised: 11/30/2018 Document Reviewed: 08/13/2018  Elsevier Patient Education © 2020 [employer]

## 2021-02-22 ENCOUNTER — HOSPITAL ENCOUNTER (OUTPATIENT)
Dept: CT IMAGING | Facility: HOSPITAL | Age: 66
Discharge: HOME | End: 2021-02-22
Admitting: NURSE PRACTITIONER

## 2021-02-22 DIAGNOSIS — R51.9 ACUTE INTRACTABLE HEADACHE, UNSPECIFIED HEADACHE TYPE: ICD-10-CM

## 2021-02-22 PROCEDURE — 70450 CT HEAD/BRAIN W/O DYE: CPT

## 2021-05-06 PROCEDURE — U0005 INFEC AGEN DETEC AMPLI PROBE: HCPCS | Performed by: PHYSICIAN ASSISTANT

## 2021-05-06 PROCEDURE — U0004 COV-19 TEST NON-CDC HGH THRU: HCPCS | Performed by: PHYSICIAN ASSISTANT

## 2021-05-07 ENCOUNTER — TELEPHONE (OUTPATIENT)
Dept: URGENT CARE | Facility: CLINIC | Age: 66
End: 2021-05-07

## 2021-05-07 NOTE — TELEPHONE ENCOUNTER
Informed Pt of negative result. Pt verbalized understanding stated she started her antibiotic/steroid yesterday felt worst today. Informed Pt to give the medicationa 2-3 days to start working then if symptoms are not improving she can follow up with [facility] or PCP for further evaluation. Pt verbalized understanding no further questions at this time.

## 2021-10-19 ENCOUNTER — OFFICE VISIT (OUTPATIENT)
Dept: OBSTETRICS AND GYNECOLOGY | Facility: CLINIC | Age: 66
End: 2021-10-19

## 2021-10-19 VITALS
WEIGHT: 145 LBS | BODY MASS INDEX: 23.3 KG/M2 | SYSTOLIC BLOOD PRESSURE: 110 MMHG | HEIGHT: 66 IN | DIASTOLIC BLOOD PRESSURE: 78 MMHG

## 2021-10-19 DIAGNOSIS — Z91.89 AT RISK FOR OSTEOPOROSIS: ICD-10-CM

## 2021-10-19 DIAGNOSIS — Z01.419 PAP TEST, AS PART OF ROUTINE GYNECOLOGICAL EXAMINATION: ICD-10-CM

## 2021-10-19 DIAGNOSIS — Z13.820 OSTEOPOROSIS SCREENING: ICD-10-CM

## 2021-10-19 DIAGNOSIS — Z87.39 HISTORY OF OSTEOPENIA: Primary | ICD-10-CM

## 2021-10-19 DIAGNOSIS — Z78.0 POSTMENOPAUSAL: ICD-10-CM

## 2021-10-19 PROCEDURE — G0101 CA SCREEN;PELVIC/BREAST EXAM: HCPCS | Performed by: OBSTETRICS & GYNECOLOGY

## 2021-10-19 NOTE — PROGRESS NOTES
GYN Annual Exam     CC - Here for annual exam.        HPI  Sabiha Gonzalez is a 66 y.o. female, , who presents for annual well woman exam.  She is postmenopausal.  Patient denies vaginal bleeding. ..  Patient reports problems with: urinary incontinence, intermittent L breast pain-worse after exercise or excessive use of L arm. Since her last visit the patient was diagnosed with fracture knee . Partner Status: Marital Status: .  New Partners since last visit: no.      Additional OB/GYN History   Current contraception: contraceptive methods: Post menopausal status  Desires to: do not start contraception  On HRT? No  Last Pap :   Last Completed Pap Smear     This patient has no relevant Health Maintenance data.        History of abnormal Pap smear: no  Family history of uterine, colon, breast, or ovarian cancer: yes - breast CA in her mother  Performs monthly Self-Breast Exam: yes  Last mammogram: 2021. Done at [facility] [place].    Last Completed Mammogram          MAMMOGRAM (Yearly) Next due on 2021  SCANNED - MAMMO    2020  SCANNED - MAMMO    05/10/2019  SCANNED - MAMMO    2018  SCANNED - MAMMO    2017  SCANNED - MAMMO              Last colonoscopy:   Last Completed Colonoscopy          COLORECTAL CANCER SCREENING (COLONOSCOPY - Every 10 Years) Next due on 2017  COLONOSCOPY (Done)              Last DEXA: today-see report  Exercises Regularly: yes  Feelings of Anxiety or Depression: no      Tobacco Usage?: No   OB History        2    Para   2    Term   2            AB        Living   2       SAB        IAB        Ectopic        Molar        Multiple        Live Births   2                Health Maintenance   Topic Date Due   • ZOSTER VACCINE (2 of 2) 2014   • DXA SCAN  2019   • ANNUAL WELLNESS VISIT  2021   • PAP SMEAR  2021   • MAMMOGRAM  2022   • COLORECTAL CANCER SCREENING  2027   • TDAP/TD  "VACCINES (2 - Td or Tdap) 12/20/2027   • HEPATITIS C SCREENING  Completed   • COVID-19 Vaccine  Completed   • INFLUENZA VACCINE  Completed   • Pneumococcal Vaccine 65+  Completed       The additional following portions of the patient's history were reviewed and updated as appropriate: allergies, current medications, past family history, past medical history, past social history and past surgical history.    Review of Systems   Constitutional: Negative.    HENT: Negative.    Eyes: Negative.    Respiratory: Negative.    Cardiovascular: Negative.    Gastrointestinal: Negative.    Endocrine: Negative.    Genitourinary: Positive for breast pain and urinary incontinence.   Musculoskeletal: Negative.    Skin: Negative.    Allergic/Immunologic: Negative.    Neurological: Negative.    Hematological: Negative.    Psychiatric/Behavioral: Negative.        I have reviewed and agree with the HPI, ROS, and historical information as entered above. Kulwinder Espinosa MD    Objective   /78   Ht 167.6 cm (66\")   Wt 65.8 kg (145 lb)   Breastfeeding No   BMI 23.40 kg/m²     Physical Exam  Vitals and nursing note reviewed. Exam conducted with a chaperone present.   Constitutional:       Appearance: She is well-developed.   HENT:      Head: Normocephalic and atraumatic.   Neck:      Thyroid: No thyroid mass or thyromegaly.   Cardiovascular:      Rate and Rhythm: Normal rate and regular rhythm.      Heart sounds: No murmur heard.      Pulmonary:      Effort: Pulmonary effort is normal. No retractions.      Breath sounds: Normal breath sounds. No wheezing, rhonchi or rales.   Chest:      Chest wall: No mass or tenderness.   Breasts:      Right: Normal. No mass, nipple discharge, skin change or tenderness.      Left: Normal. No mass, nipple discharge, skin change or tenderness.       Abdominal:      General: Bowel sounds are normal.      Palpations: Abdomen is soft. Abdomen is not rigid. There is no mass.      Tenderness: There is no " abdominal tenderness. There is no guarding.      Hernia: No hernia is present. There is no hernia in the left inguinal area.   Genitourinary:     Labia:         Right: No rash, tenderness or lesion.         Left: No rash, tenderness or lesion.       Vagina: Normal. No vaginal discharge or lesions.      Cervix: No cervical motion tenderness, discharge, lesion or cervical bleeding.      Uterus: Normal. Not enlarged, not fixed and not tender.       Adnexa:         Right: No mass or tenderness.          Left: No mass or tenderness.        Rectum: No external hemorrhoid.   Musculoskeletal:      Cervical back: Normal range of motion. No muscular tenderness.   Neurological:      Mental Status: She is alert and oriented to person, place, and time.   Psychiatric:         Behavior: Behavior normal.            Assessment and Plan    Problem List Items Addressed This Visit        Genitourinary and Reproductive     Postmenopausal    Relevant Orders    DEXA Bone Density Axial      Other Visit Diagnoses     History of osteopenia    -  Primary    Relevant Orders    DEXA Bone Density Axial    Osteoporosis screening        Relevant Orders    DEXA Bone Density Axial    At risk for osteoporosis        Relevant Orders    DEXA Bone Density Axial    Pap test, as part of routine gynecological examination        Relevant Orders    Pap IG, Rfx HPV ASCU          1. GYN annual well woman exam.   2. Reviewed monthly self breast exams.  Instructed to call with lumps, pain, or breast discharge.  Yearly mammograms ordered.  3. Recommended use of Vitamin D and getting adequate calcium in her diet. (1500mg)  4. Osteoporosis screening ordered today.  5. Reviewed exercise as a preventative health measures.   6. Symptoms of menopausal transition reviewed with patient.   7. RTC in 1 year or PRN with problems.  8. Other: Hips mildly osteopenic and we will repeat BDS 2 years  9. No follow-ups on file.     Kulwinder Espinosa MD  10/19/2021

## 2021-10-27 DIAGNOSIS — Z01.419 PAP TEST, AS PART OF ROUTINE GYNECOLOGICAL EXAMINATION: ICD-10-CM

## 2022-04-11 ENCOUNTER — TELEPHONE (OUTPATIENT)
Dept: FAMILY MEDICINE CLINIC | Facility: CLINIC | Age: 67
End: 2022-04-11

## 2022-04-12 NOTE — TELEPHONE ENCOUNTER
Caller: Sabiha Gonzalez    Relationship: Self    Best call back number: [phone] (H)    What orders are you requesting (i.e. lab or imaging): ECO AND HOLTER MONITOR     In what timeframe would the patient need to come in: ASAP     Where will you receive your lab/imaging services: WITH IN [facility]     Additional notes: PATIENT WAS SEEN IN THE ER IN [facility] ER- PATIENT WAS HAVING HEART ARRHYTHMIA AND SHE WAS HAVING PVC AND PAC. THE ER PROVIDER WANTED THE PATIENT TO GET A ECO AND A HALTER MONITOR.         
Pt stated she already spoke to someone and they informed her that she need mukul appointment   
Yes

## 2022-04-14 ENCOUNTER — HOSPITAL ENCOUNTER (OUTPATIENT)
Dept: CARDIOLOGY | Facility: HOSPITAL | Age: 67
Discharge: HOME | End: 2022-04-14

## 2022-04-14 ENCOUNTER — OFFICE VISIT (OUTPATIENT)
Dept: CARDIOLOGY | Facility: HOSPITAL | Age: 67
End: 2022-04-14

## 2022-04-14 ENCOUNTER — OFFICE VISIT (OUTPATIENT)
Dept: FAMILY MEDICINE CLINIC | Facility: CLINIC | Age: 67
End: 2022-04-14

## 2022-04-14 VITALS
DIASTOLIC BLOOD PRESSURE: 78 MMHG | HEART RATE: 84 BPM | HEIGHT: 66 IN | OXYGEN SATURATION: 98 % | WEIGHT: 138 LBS | RESPIRATION RATE: 20 BRPM | TEMPERATURE: 97 F | SYSTOLIC BLOOD PRESSURE: 110 MMHG | BODY MASS INDEX: 22.18 KG/M2

## 2022-04-14 VITALS
WEIGHT: 148 LBS | BODY MASS INDEX: 23.78 KG/M2 | DIASTOLIC BLOOD PRESSURE: 71 MMHG | SYSTOLIC BLOOD PRESSURE: 133 MMHG | HEART RATE: 81 BPM | RESPIRATION RATE: 16 BRPM | HEIGHT: 66 IN | TEMPERATURE: 97.2 F | OXYGEN SATURATION: 97 %

## 2022-04-14 DIAGNOSIS — I49.8 SINUS ARRHYTHMIA: ICD-10-CM

## 2022-04-14 DIAGNOSIS — I49.1 PAC (PREMATURE ATRIAL CONTRACTION): ICD-10-CM

## 2022-04-14 DIAGNOSIS — R40.4 EPISODE OF ALTERED CONSCIOUSNESS: ICD-10-CM

## 2022-04-14 DIAGNOSIS — I45.10 RIGHT BUNDLE BRANCH BLOCK: ICD-10-CM

## 2022-04-14 DIAGNOSIS — R00.2 PALPITATIONS: ICD-10-CM

## 2022-04-14 DIAGNOSIS — I49.3 PVC (PREMATURE VENTRICULAR CONTRACTION): ICD-10-CM

## 2022-04-14 DIAGNOSIS — R00.2 PALPITATIONS: Primary | ICD-10-CM

## 2022-04-14 PROCEDURE — 99213 OFFICE O/P EST LOW 20 MIN: CPT | Performed by: NURSE PRACTITIONER

## 2022-04-14 PROCEDURE — 99214 OFFICE O/P EST MOD 30 MIN: CPT | Performed by: NURSE PRACTITIONER

## 2022-04-14 RX ORDER — METOPROLOL SUCCINATE 25 MG/1
25 TABLET, EXTENDED RELEASE ORAL DAILY
Qty: 30 TABLET | Refills: 0 | Status: SHIPPED | OUTPATIENT
Start: 2022-04-14 | End: 2022-05-10 | Stop reason: SDUPTHER

## 2022-04-14 NOTE — PATIENT INSTRUCTIONS
- Office will call to schedule testing    - Office will call or send message with testing results    -Cardiology will call to schedule an appointment

## 2022-04-14 NOTE — PROGRESS NOTES
Follow Up Office Note     Patient Name: Sabiha Gonzalez  : 1955   MRN: [id number]     Chief Complaint:    Chief Complaint   Patient presents with   • Irregular Heart Beat       History of Present Illness: Sabiha Gonzalez is a 66 y.o. female who presents today for new onset of heart palpitations.  Patient was on a trip to [place] over the weekend when she began having heart palpitations.   She states that she also had a feeling of something being off or not right.  She states that she felt somewhat lightheaded, but did not have any syncopal episodes or near syncope. Patient went to an emergency room on Kenji, April 10 in [place]. She has a copy of her ECG with her today as well as her labs which I have personally reviewed.   She denies chest pain, shortness of breath or weakness.  Patient denies any new medications or supplements.  She has not changed her caffeine intake (states she drinks 2 cups of coffee in the morning) and denies taking any Sudafed-containing cold products recently.  She can identify no precipitating events, but states she notices the palpitations more when she is lying down at night.  Patient has a brother with atrial fibrillation.      Subjective      Review of Systems:   Review of Systems   Constitutional: Negative for activity change, appetite change, chills, diaphoresis, fatigue and unexpected weight change.   Respiratory: Negative for cough, chest tightness, shortness of breath, wheezing and stridor.    Cardiovascular: Positive for palpitations. Negative for chest pain and leg swelling.   Gastrointestinal: Negative.    Neurological: Negative.         Past Medical History:   Past Medical History:   Diagnosis Date   • Breast cancer (HCC)    • Breast cancer (HCC)     12 years ago R breast   • Contact dermatitis    • Genital herpes    • Leg fracture, left    • Osteopenia    • Urethral polyp          Medications:     Current Outpatient Medications:   •   "Multiple Vitamin (MULTI VITAMIN) tablet, Take  by mouth., Disp: , Rfl:   •  Nutritional Supplements (VITAMIN D BOOSTER PO), Take  by mouth., Disp: , Rfl:     Allergies:   No Known Allergies      Objective     Physical Exam:  Vital Signs:   Vitals:    04/14/22 0921   BP: 110/78   Pulse: 84   Resp: 20   Temp: 97 °F (36.1 °C)   SpO2: 98%   Weight: 62.6 kg (138 lb)   Height: 167.6 cm (66\")   PainSc: 0-No pain     Body mass index is 22.27 kg/m².     Physical Exam  Vitals and nursing note reviewed.   Constitutional:       General: She is not in acute distress.     Appearance: Normal appearance. She is well-developed. She is not ill-appearing, toxic-appearing or diaphoretic.   HENT:      Head: Normocephalic and atraumatic.   Neck:      Vascular: No carotid bruit.   Cardiovascular:      Rate and Rhythm: Normal rate. Rhythm irregular.      Heart sounds: Normal heart sounds, S1 normal and S2 normal. No murmur heard.  Pulmonary:      Effort: Pulmonary effort is normal. No respiratory distress.      Breath sounds: Normal breath sounds. No stridor. No wheezing.   Musculoskeletal:      Right lower leg: No edema.      Left lower leg: No edema.   Skin:     General: Skin is warm and dry.   Neurological:      General: No focal deficit present.      Mental Status: She is alert and oriented to person, place, and time.   Psychiatric:         Mood and Affect: Mood normal.         Behavior: Behavior normal. Behavior is cooperative.         Thought Content: Thought content normal.         Judgment: Judgment normal.         Assessment / Plan      Assessment/Plan:   Diagnoses and all orders for this visit:    1. Palpitations (Primary)  -     Ambulatory Referral to [facility] [place] [facility] [place]    2. Right bundle branch block  -     Ambulatory Referral to [facility] [place] [facility] [place]    3. PVC (premature ventricular contraction)  -     Ambulatory Referral to [facility] [place] - [place]    4. Sinus " arrhythmia  -     Ambulatory Referral to [facility] [place] [facility] [place]    I personally contacted [facility] and was able to get patient an appointment for 11 AM this morning.  Patient verbalized understanding and agreement with treatment plan.      Follow Up:   PRN and at next scheduled appointment(s) with PCP.    Discussed the nature of the medical condition(s) risks, complications, implications, management, safe and proper use of medications. Encouraged medication compliance, and keeping scheduled follow up appointments with me and any other providers.      I spent 20 minutes caring for Sabiha on this date of service. This time includes time spent by me in the following activities:preparing for the visit, reviewing tests, performing a medically appropriate examination and/or evaluation , counseling and educating the patient/family/caregiver, referring and communicating with other health care professionals , documenting information in the medical record and care coordination.    RTC if symptoms fail to improve, to ER if symptoms worsen.      JAY Pinzon  [facility] [place]

## 2022-04-14 NOTE — PROGRESS NOTES
[facility] Heart Monitor Documentation    Sabiha Gonzalez  1955  [id number]  04/14/22      [] ZIO XT Patch  Model [id number] Prescribed for  Days    · Serial Number: (N + 9 Digits) N   · Apply-By Date on Box:   · USPS Tracking Number:   · USPS Tracking        [x] [employer] BodyGuardian MINI PLUS Mobile Cardiac Telemetry  Model [id number] Prescribed for 30 Days    · Serial Number: (BGM + 7 Digits) [id number]  · Shipped-By Date on Box: 04/07/2022  · UPS Tracking Number: [id number]  · UPS Tracking      [] [employer]ice BodyGuardian MINI Holter Monitor  Model BGMINIEL Prescribed for  Days    · Serial Number: (7 Digits)   · Shipped-By Date on Box:   · UPS Tracking Number: [id number]  · UPS Tracking        This monitor was applied to the patient's chest and checked for proper functioning.  Ms. Sabiha Gonzalez was instructed in the proper use of this monitor.  She was given the opportunity to ask questions and left the office with the device 's instruction manual.    Shala Maldonado, [facility], 12:19 EDT, 04/14/22                  [facility] 8[facility]

## 2022-04-14 NOTE — PROGRESS NOTES
"Chief Complaint  Establish Care (Right bundle branch block)    Subjective      History of Present Illness {CC  Problem List  Visit  Diagnosis   Encounters  Notes  Medications  Labs  Result Review Imaging  Media :23}     Sabiha Gonzalez, 66 y.o. female with history of breast cancer presents to [facility] Heart and Valve clinic for Establish Care (Right bundle branch block).  Very pleasant retired nurse.    Patient presents today upon referral from PCP JAY Pinzon. Patient was recently evaluated at a hospital in [place] that revealed incomplete RBBB and PVCs/PACs were noted during monitoring.    Patient presents today noting intermittent palpitation symptoms for the past year; symptoms have worsened in the past 2 weeks. No previous cardiac/arrhythmia history.  Does note recent Moderna COVID-19 booster (4th shot) a few weeks ago. She notes palpitation symptoms approximately 1 hour in duration. Described heartbeat as skipped and thumping heartbeat.  She denies anginal pain with exercise, dyspnea, lightheadedness, and syncope.  Caffeine intake is 2 cups of coffee per day, stays well hydrated. Stress echo possibly 14-15 years ago.  Outside records reviewed from evaluation [place] reveal ECG with right bundle branch block, PAC/PVC.        Objective     Vital Signs:   Vitals:    04/14/22 1118 04/14/22 1120   BP: 125/67 133/71   BP Location: Left arm Left arm   Patient Position: Standing Sitting   Cuff Size: Adult Adult   Pulse: 89 81   Resp:  16   Temp: 97.2 °F (36.2 °C) 97.2 °F (36.2 °C)   TempSrc: Temporal Temporal   SpO2: 97% 97%   Weight:  67.1 kg (148 lb)   Height:  167.6 cm (66\")     Body mass index is 23.89 kg/m².  Physical Exam  Vitals and nursing note reviewed.   Constitutional:       Appearance: Normal appearance.   HENT:      Head: Normocephalic.   Eyes:      Extraocular Movements: Extraocular movements intact.   Neck:      Vascular: No carotid bruit.   Cardiovascular: "      Rate and Rhythm: Normal rate and regular rhythm.      Pulses: Normal pulses.      Heart sounds: Normal heart sounds, S1 normal and S2 normal. No murmur heard.  Pulmonary:      Effort: Pulmonary effort is normal. No respiratory distress.      Breath sounds: Normal breath sounds.   Musculoskeletal:      Cervical back: Neck supple.      Right lower leg: No edema.      Left lower leg: No edema.   Skin:     General: Skin is warm and dry.   Neurological:      General: No focal deficit present.      Mental Status: She is alert.   Psychiatric:         Mood and Affect: Mood normal.         Behavior: Behavior normal.         Thought Content: Thought content normal.        Data Reviewed:{ Labs  Result Review  Imaging  Med Tab  Media :23}     SCANNED EKG (04/10/2022)  TSH (07/22/2020 10:48)  Lipid Panel (07/22/2020 10:48)  Comprehensive Metabolic Panel (07/22/2020 10:48)  CBC & Differential (07/22/2020 10:48)      Assessment/Plan   Assessment and Plan {CC Problem List  Visit Diagnosis  ROS  Review (Popup)  Health Maintenance  Quality  BestPractice  Medications  SmartSets  SnapShot Encounters  Media :23}     1. Palpitations  -Worsened palpitation symptoms prompted recent emergency department evaluation in [place].  Work-up revealed ECG/telemetry monitoring with PACs/PVC and incomplete right bundle branch block.  Given her sinus arrhythmia/PACs will complete MCOT for close arrhythmia monitoring.  Echocardiogram for structural/functional evaluation  -Outside records reviewed with normal electrolytes/CBC during work-up  - Adult Transthoracic Echo Complete W/ Cont if Necessary Per Protocol; Future  - Mobile Cardiac Outpatient Telemetry; Future  - metoprolol succinate XL (TOPROL-XL) 25 MG 24 hr tablet; Take 1 tablet by mouth Daily.  Dispense: 30 tablet; Refill: 0    2. PAC (premature atrial contraction)/PVC (premature ventricular contraction)  -Evident on outside record telemetry/ECG.  - Adult  Transthoracic Echo Complete W/ Cont if Necessary Per Protocol; Future  - Mobile Cardiac Outpatient Telemetry; Future  - metoprolol succinate XL (TOPROL-XL) 25 MG 24 hr tablet; Take 1 tablet by mouth Daily.  Dispense: 30 tablet; Refill: 0  - Ambulatory Referral to Cardiology      Follow Up {Instructions Charge Capture  Follow-up Communications :23}     Return in about 6 weeks (around 5/26/2022) for Office follow-up, Monitor results, Results follow-up.      Patient was given instructions and counseling regarding her condition or for health maintenance advice. Please see specific information pulled into the AVS if appropriate.  Patient was instructed to call the Heart and Valve Center with any questions, concerns, or worsening symptoms.    Dictated Utilizing Dragon Dictation   Please note that portions of this note were completed with a voice recognition program.   Part of this note may be an electronic transcription/translation of spoken language to printed text using the Dragon Dictation System.

## 2022-04-18 ENCOUNTER — HOSPITAL ENCOUNTER (OUTPATIENT)
Dept: CARDIOLOGY | Facility: HOSPITAL | Age: 67
Discharge: HOME | End: 2022-04-18
Admitting: NURSE PRACTITIONER

## 2022-04-18 VITALS — WEIGHT: 147.93 LBS | HEIGHT: 66 IN | BODY MASS INDEX: 23.77 KG/M2

## 2022-04-18 DIAGNOSIS — I49.1 PAC (PREMATURE ATRIAL CONTRACTION): ICD-10-CM

## 2022-04-18 DIAGNOSIS — R00.2 PALPITATIONS: ICD-10-CM

## 2022-04-18 DIAGNOSIS — I49.3 PVC (PREMATURE VENTRICULAR CONTRACTION): ICD-10-CM

## 2022-04-18 LAB
BH CV ECHO MEAS - AI DEC SLOPE: 159.7 CM/SEC^2
BH CV ECHO MEAS - AI MAX PG: 61.5 MMHG
BH CV ECHO MEAS - AI MAX VEL: 391.5 CM/SEC
BH CV ECHO MEAS - AI P1/2T: 718.2 MSEC
BH CV ECHO MEAS - AO MAX PG (FULL): 1.9 MMHG
BH CV ECHO MEAS - AO MAX PG: 6.2 MMHG
BH CV ECHO MEAS - AO MEAN PG (FULL): 1.5 MMHG
BH CV ECHO MEAS - AO MEAN PG: 3.5 MMHG
BH CV ECHO MEAS - AO ROOT AREA (BSA CORRECTED): 1.7
BH CV ECHO MEAS - AO ROOT AREA: 7 CM^2
BH CV ECHO MEAS - AO ROOT DIAM: 3 CM
BH CV ECHO MEAS - AO V2 MAX: 124.2 CM/SEC
BH CV ECHO MEAS - AO V2 MEAN: 87.3 CM/SEC
BH CV ECHO MEAS - AO V2 VTI: 25.8 CM
BH CV ECHO MEAS - ASC AORTA: 3 CM
BH CV ECHO MEAS - AVA(I,A): 2.4 CM^2
BH CV ECHO MEAS - AVA(I,D): 2.4 CM^2
BH CV ECHO MEAS - AVA(V,A): 2.6 CM^2
BH CV ECHO MEAS - AVA(V,D): 2.6 CM^2
BH CV ECHO MEAS - BSA(HAYCOCK): 1.8 M^2
BH CV ECHO MEAS - BSA: 1.8 M^2
BH CV ECHO MEAS - BZI_BMI: 23.9 KILOGRAMS/M^2
BH CV ECHO MEAS - BZI_METRIC_HEIGHT: 167.6 CM
BH CV ECHO MEAS - BZI_METRIC_WEIGHT: 67.1 KG
BH CV ECHO MEAS - EDV(CUBED): 124.7 ML
BH CV ECHO MEAS - EDV(MOD-SP2): 80 ML
BH CV ECHO MEAS - EDV(MOD-SP4): 102 ML
BH CV ECHO MEAS - EDV(TEICH): 118.1 ML
BH CV ECHO MEAS - EF(CUBED): 65.3 %
BH CV ECHO MEAS - EF(MOD-BP): 60 %
BH CV ECHO MEAS - EF(MOD-SP2): 58.8 %
BH CV ECHO MEAS - EF(MOD-SP4): 57.8 %
BH CV ECHO MEAS - EF(TEICH): 56.6 %
BH CV ECHO MEAS - ESV(CUBED): 43.2 ML
BH CV ECHO MEAS - ESV(MOD-SP2): 33 ML
BH CV ECHO MEAS - ESV(MOD-SP4): 43 ML
BH CV ECHO MEAS - ESV(TEICH): 51.2 ML
BH CV ECHO MEAS - FS: 29.8 %
BH CV ECHO MEAS - IVS/LVPW: 0.9
BH CV ECHO MEAS - IVSD: 0.61 CM
BH CV ECHO MEAS - LA DIMENSION: 3 CM
BH CV ECHO MEAS - LA/AO: 0.99
BH CV ECHO MEAS - LAD MAJOR: 5.1 CM
BH CV ECHO MEAS - LAT PEAK E' VEL: 8.6 CM/SEC
BH CV ECHO MEAS - LATERAL E/E' RATIO: 6
BH CV ECHO MEAS - LV DIASTOLIC VOL/BSA (35-75): 58 ML/M^2
BH CV ECHO MEAS - LV IVRT: 0.05 SEC
BH CV ECHO MEAS - LV MASS(C)D: 102.6 GRAMS
BH CV ECHO MEAS - LV MASS(C)DI: 58.3 GRAMS/M^2
BH CV ECHO MEAS - LV MAX PG: 4.3 MMHG
BH CV ECHO MEAS - LV MEAN PG: 2 MMHG
BH CV ECHO MEAS - LV SYSTOLIC VOL/BSA (12-30): 24.4 ML/M^2
BH CV ECHO MEAS - LV V1 MAX: 103.2 CM/SEC
BH CV ECHO MEAS - LV V1 MEAN: 63.8 CM/SEC
BH CV ECHO MEAS - LV V1 VTI: 20.2 CM
BH CV ECHO MEAS - LVIDD: 5 CM
BH CV ECHO MEAS - LVIDS: 3.5 CM
BH CV ECHO MEAS - LVLD AP2: 7.3 CM
BH CV ECHO MEAS - LVLD AP4: 7.9 CM
BH CV ECHO MEAS - LVLS AP2: 6.2 CM
BH CV ECHO MEAS - LVLS AP4: 6.6 CM
BH CV ECHO MEAS - LVOT AREA (M): 3.1 CM^2
BH CV ECHO MEAS - LVOT AREA: 3.1 CM^2
BH CV ECHO MEAS - LVOT DIAM: 2 CM
BH CV ECHO MEAS - LVPWD: 0.67 CM
BH CV ECHO MEAS - MED PEAK E' VEL: 8.3 CM/SEC
BH CV ECHO MEAS - MEDIAL E/E' RATIO: 6.2
BH CV ECHO MEAS - MV A MAX VEL: 68.2 CM/SEC
BH CV ECHO MEAS - MV DEC TIME: 0.26 SEC
BH CV ECHO MEAS - MV E MAX VEL: 52.4 CM/SEC
BH CV ECHO MEAS - MV E/A: 0.77
BH CV ECHO MEAS - PA ACC SLOPE: 747.6 CM/SEC^2
BH CV ECHO MEAS - PA ACC TIME: 0.1 SEC
BH CV ECHO MEAS - PA PR(ACCEL): 36.2 MMHG
BH CV ECHO MEAS - RAP SYSTOLE: 8 MMHG
BH CV ECHO MEAS - RVSP: 30 MMHG
BH CV ECHO MEAS - SI(AO): 103.3 ML/M^2
BH CV ECHO MEAS - SI(CUBED): 46.3 ML/M^2
BH CV ECHO MEAS - SI(LVOT): 35.4 ML/M^2
BH CV ECHO MEAS - SI(MOD-SP2): 26.7 ML/M^2
BH CV ECHO MEAS - SI(MOD-SP4): 33.5 ML/M^2
BH CV ECHO MEAS - SI(TEICH): 38 ML/M^2
BH CV ECHO MEAS - SV(AO): 181.8 ML
BH CV ECHO MEAS - SV(CUBED): 81.5 ML
BH CV ECHO MEAS - SV(LVOT): 62.2 ML
BH CV ECHO MEAS - SV(MOD-SP2): 47 ML
BH CV ECHO MEAS - SV(MOD-SP4): 59 ML
BH CV ECHO MEAS - SV(TEICH): 66.8 ML
BH CV ECHO MEAS - TAPSE (>1.6): 2 CM
BH CV ECHO MEAS - TR MAX PG: 22 MMHG
BH CV ECHO MEAS - TR MAX VEL: 228.5 CM/SEC
BH CV ECHO MEASUREMENTS AVERAGE E/E' RATIO: 6.2
BH CV XLRA - RV BASE: 4.1 CM
BH CV XLRA - RV LENGTH: 6.2 CM
BH CV XLRA - RV MID: 3.1 CM
BH CV XLRA - TDI S': 9.43 CM/SEC
LEFT ATRIUM VOLUME INDEX: 31.3 ML/M^2
LEFT ATRIUM VOLUME: 55 ML

## 2022-04-18 PROCEDURE — 93306 TTE W/DOPPLER COMPLETE: CPT | Performed by: INTERNAL MEDICINE

## 2022-04-18 PROCEDURE — 93306 TTE W/DOPPLER COMPLETE: CPT

## 2022-05-10 DIAGNOSIS — I49.3 PVC (PREMATURE VENTRICULAR CONTRACTION): ICD-10-CM

## 2022-05-10 DIAGNOSIS — R00.2 PALPITATIONS: ICD-10-CM

## 2022-05-10 DIAGNOSIS — I49.1 PAC (PREMATURE ATRIAL CONTRACTION): ICD-10-CM

## 2022-05-10 RX ORDER — METOPROLOL SUCCINATE 25 MG/1
25 TABLET, EXTENDED RELEASE ORAL DAILY
Qty: 30 TABLET | Refills: 0 | Status: SHIPPED | OUTPATIENT
Start: 2022-05-10 | End: 2022-05-10 | Stop reason: SDUPTHER

## 2022-05-10 RX ORDER — METOPROLOL SUCCINATE 25 MG/1
25 TABLET, EXTENDED RELEASE ORAL DAILY
Qty: 30 TABLET | Refills: 2 | Status: SHIPPED | OUTPATIENT
Start: 2022-05-10 | End: 2022-07-05 | Stop reason: SDUPTHER

## 2022-06-06 PROCEDURE — 93228 REMOTE 30 DAY ECG REV/REPORT: CPT | Performed by: INTERNAL MEDICINE

## 2022-06-07 ENCOUNTER — OFFICE VISIT (OUTPATIENT)
Dept: CARDIOLOGY | Facility: CLINIC | Age: 67
End: 2022-06-07

## 2022-06-07 VITALS
HEIGHT: 66 IN | SYSTOLIC BLOOD PRESSURE: 116 MMHG | DIASTOLIC BLOOD PRESSURE: 64 MMHG | OXYGEN SATURATION: 97 % | HEART RATE: 54 BPM | BODY MASS INDEX: 23.3 KG/M2 | WEIGHT: 145 LBS

## 2022-06-07 DIAGNOSIS — R79.9 ABNORMAL FINDING OF BLOOD CHEMISTRY, UNSPECIFIED: ICD-10-CM

## 2022-06-07 DIAGNOSIS — R00.2 PALPITATIONS: Primary | ICD-10-CM

## 2022-06-07 PROCEDURE — 93000 ELECTROCARDIOGRAM COMPLETE: CPT | Performed by: INTERNAL MEDICINE

## 2022-06-07 PROCEDURE — 99214 OFFICE O/P EST MOD 30 MIN: CPT | Performed by: INTERNAL MEDICINE

## 2022-06-07 NOTE — PROGRESS NOTES
[facility] Cardiology   Consult  Sabiha Gonzalez  1955    VISIT DATE:  22    PCP:   Darlin Sierra, APRN  [id number] [facility] [address] [place], [place] [id number]        CC:  pvc's      Problem List:  1.  Mild valvular heart disease  2.  PVCs  3.  Prior Breast Cancer  -treated with cytoxan  -Her 2 positive  -right mastectomy, no radiation    Cardiac testing:    Echo 2022:  · Calculated left ventricular EF = 60% Left ventricular systolic function is normal.  · Mild aortic valve regurgitation is present  · Mild mitral valve regurgitation is present.  · Mild tricuspid valve regurgitation is present. Estimated right ventricular systolic pressure from tricuspid regurgitation is normal (<35 mmHg).    Holter monitor: Rare PVCs    History of Present Illness:  Sabiha Gonzalez  Is a 66 y.o. female with pertinent cardiac history detailed above.  Patient was seen in the heart valve clinic for palpitations.  Has a history of PACs and PVCs.  She had a period where they occurred frequently and consecutively while she was in [place].  Recent monitoring did show PVCs but no sustained arrhythmias.  Echo shows mild valvular disease.  She is now on toprol.  Palpitations feel improved.  No c/o CP or dyspnea.  Now feeling well.  EKG today sinus shaunna no ectopy.   Palpitations seem to increase at night, with caffeine, or with mild EtOh use.  Overall feeling well at present time.      Patient Active Problem List    Diagnosis Date Noted   • Leg fracture, left    • Polyp of urethra 2017   • Breast cancer (HCC)    • Postmenopausal 03/15/2017   • Genital herpes 03/15/2017       No Known Allergies    Social History     Socioeconomic History   • Marital status:    Tobacco Use   • Smoking status: Former Smoker     Types: Cigarettes     Quit date: 1984     Years since quittin.1   • Smokeless tobacco: Never Used   Substance and Sexual Activity   • Alcohol use: Yes     Comment: 1-2 glass  "wine daily   • Drug use: No   • Sexual activity: Not Currently       Family History   Problem Relation Age of Onset   • Heart disease Mother    • Diabetes Mother    • Breast cancer Mother    • Stroke Father    • Heart disease Brother 55   • Diabetes Brother    • Atrial fibrillation Brother    • Other Brother         Covid   • COPD Brother    • No Known Problems Maternal Grandmother    • No Known Problems Maternal Grandfather    • No Known Problems Paternal Grandmother    • No Known Problems Paternal Grandfather    • Ovarian cancer Neg Hx    • Uterine cancer Neg Hx    • Colon cancer Neg Hx        Current Medications:    Current Outpatient Medications:   •  metoprolol succinate XL (TOPROL-XL) 25 MG 24 hr tablet, Take 1 tablet by mouth Daily. (Patient taking differently: Take 25 mg by mouth Daily. 1.5 tablet daily), Disp: 30 tablet, Rfl: 2  •  Multiple Vitamin (MULTI VITAMIN) tablet, Take  by mouth., Disp: , Rfl:      Review of Systems   Cardiovascular: Negative for chest pain, dyspnea on exertion, irregular heartbeat, leg swelling, near-syncope and palpitations.       Vitals:    06/07/22 1258   BP: 116/64   BP Location: Left arm   Patient Position: Sitting   Pulse: 54   SpO2: 97%   Weight: 65.8 kg (145 lb)   Height: 167.6 cm (66\")       Physical Exam  Constitutional:       Appearance: Normal appearance.   Neck:      Vascular: No carotid bruit.   Cardiovascular:      Rate and Rhythm: Regular rhythm. Bradycardia present.      Pulses: Normal pulses.      Heart sounds: Normal heart sounds.   Pulmonary:      Effort: Pulmonary effort is normal.      Breath sounds: Normal breath sounds.   Abdominal:      Palpations: Abdomen is soft.   Musculoskeletal:      Right lower leg: No edema.      Left lower leg: No edema.   Neurological:      General: No focal deficit present.      Mental Status: She is alert.         Diagnostic Data:    ECG 12 Lead    Date/Time: 6/7/2022 1:10 PM  Performed by: Oliverio Jones MD  Authorized " by: Oliverio Jones MD   Comparison: compared with previous ECG from 4/10/2022  Rhythm: sinus bradycardia  Rate: bradycardic  BPM: 54  Conduction: incomplete left bundle branch block  QRS axis: normal    Clinical impression: non-specific ECG          Lab Results   Component Value Date    TRIG 67 07/22/2020    HDL 97 (H) 07/22/2020     Lab Results   Component Value Date    GLUCOSE 82 07/22/2020    BUN 12 07/22/2020    CREATININE 0.62 07/22/2020     07/22/2020    K 4.3 07/22/2020     07/22/2020    CO2 21.2 (L) 07/22/2020     No results found for: HGBA1C  Lab Results   Component Value Date    WBC 6.43 07/22/2020    HGB 12.8 07/22/2020    HCT 40.1 07/22/2020     07/22/2020       Assessment:   Diagnosis Plan   1. Palpitations  ECG 12 Lead       Plan:      1.  Mild valvular disease  -EF 60%.  We will monitor with periodic echo.  Can repeat in about 3 years.    2.  PVCs  -No current anginal symptoms, no ischemic changes on EKG.  -PVCs well-controlled on metoprolol  -No changes made today    3.  Healthcare maintenance  -rec recheck lipids and CMP.    Follow-up 1 year or sooner as needed.      Oliverio Jones MD [facility]

## 2022-06-09 ENCOUNTER — LAB (OUTPATIENT)
Dept: LAB | Facility: HOSPITAL | Age: 67
End: 2022-06-09

## 2022-06-09 DIAGNOSIS — R00.2 PALPITATIONS: ICD-10-CM

## 2022-06-09 DIAGNOSIS — R79.9 ABNORMAL FINDING OF BLOOD CHEMISTRY, UNSPECIFIED: ICD-10-CM

## 2022-06-09 LAB
ALBUMIN SERPL-MCNC: 4.3 G/DL (ref 3.5–5.2)
ALBUMIN/GLOB SERPL: 1.8 G/DL
ALP SERPL-CCNC: 60 U/L (ref 39–117)
ALT SERPL W P-5'-P-CCNC: 14 U/L (ref 1–33)
ANION GAP SERPL CALCULATED.3IONS-SCNC: 11.6 MMOL/L (ref 5–15)
AST SERPL-CCNC: 17 U/L (ref 1–32)
BILIRUB SERPL-MCNC: 0.4 MG/DL (ref 0–1.2)
BUN SERPL-MCNC: 16 MG/DL (ref 8–23)
BUN/CREAT SERPL: 21.3 (ref 7–25)
CALCIUM SPEC-SCNC: 9.2 MG/DL (ref 8.6–10.5)
CHLORIDE SERPL-SCNC: 103 MMOL/L (ref 98–107)
CHOLEST SERPL-MCNC: 206 MG/DL (ref 0–200)
CO2 SERPL-SCNC: 22.4 MMOL/L (ref 22–29)
CREAT SERPL-MCNC: 0.75 MG/DL (ref 0.57–1)
EGFRCR SERPLBLD CKD-EPI 2021: 86.8 ML/MIN/1.73
GLOBULIN UR ELPH-MCNC: 2.4 GM/DL
GLUCOSE SERPL-MCNC: 75 MG/DL (ref 65–99)
HDLC SERPL-MCNC: 100 MG/DL (ref 40–60)
LDLC SERPL CALC-MCNC: 94 MG/DL (ref 0–100)
LDLC/HDLC SERPL: 0.93 {RATIO}
POTASSIUM SERPL-SCNC: 4.6 MMOL/L (ref 3.5–5.2)
PROT SERPL-MCNC: 6.7 G/DL (ref 6–8.5)
SODIUM SERPL-SCNC: 137 MMOL/L (ref 136–145)
TRIGL SERPL-MCNC: 66 MG/DL (ref 0–150)
VLDLC SERPL-MCNC: 12 MG/DL (ref 5–40)

## 2022-06-09 PROCEDURE — 36415 COLL VENOUS BLD VENIPUNCTURE: CPT

## 2022-06-09 PROCEDURE — 80061 LIPID PANEL: CPT

## 2022-06-09 PROCEDURE — 80053 COMPREHEN METABOLIC PANEL: CPT

## 2022-07-05 DIAGNOSIS — R00.2 PALPITATIONS: ICD-10-CM

## 2022-07-05 DIAGNOSIS — I49.3 PVC (PREMATURE VENTRICULAR CONTRACTION): ICD-10-CM

## 2022-07-05 DIAGNOSIS — I49.1 PAC (PREMATURE ATRIAL CONTRACTION): ICD-10-CM

## 2022-07-05 RX ORDER — METOPROLOL SUCCINATE 25 MG/1
37.5 TABLET, EXTENDED RELEASE ORAL DAILY
Qty: 135 TABLET | Refills: 3 | Status: SHIPPED | OUTPATIENT
Start: 2022-07-05

## 2022-07-05 NOTE — TELEPHONE ENCOUNTER
Caller: MOOSE STRICKLAND     Relationship: SELF    Best call back number: [phone]    Requested Prescriptions:   Requested Prescriptions     Pending Prescriptions Disp Refills   • metoprolol succinate XL (TOPROL-XL) 25 MG 24 hr tablet       Sig: Take 1 tablet by mouth Daily. 1.5 tablet daily        Pharmacy where request should be sent:   [id number] [place] [address] AT [facility] [address] [facility] [phone] [facility] [phone]   [phone]    Additional details provided by patient: STATED THAT SHE HAS REFILLS, HOWEVER, WHERE SHE HAS BEEN TAKING 1.5 A DAY INSTEAD OF JUST 1, SHE HAS RAN OUT AND INSURANCE WILL NOT REFILL AT THIS TIME    Does the patient have less than a 3 day supply:  [] Yes  [] No    Willi CASTANEDA Rep   07/05/22 13:25 EDT

## 2023-03-08 ENCOUNTER — OFFICE VISIT (OUTPATIENT)
Dept: FAMILY MEDICINE CLINIC | Facility: CLINIC | Age: 68
End: 2023-03-08
Payer: MEDICARE

## 2023-03-08 ENCOUNTER — LAB (OUTPATIENT)
Dept: LAB | Facility: HOSPITAL | Age: 68
End: 2023-03-08
Payer: MEDICARE

## 2023-03-08 VITALS
OXYGEN SATURATION: 98 % | TEMPERATURE: 98.6 F | BODY MASS INDEX: 23.91 KG/M2 | HEART RATE: 57 BPM | DIASTOLIC BLOOD PRESSURE: 80 MMHG | HEIGHT: 66 IN | SYSTOLIC BLOOD PRESSURE: 118 MMHG | WEIGHT: 148.8 LBS

## 2023-03-08 DIAGNOSIS — Z23 NEED FOR VACCINATION FOR STREP PNEUMONIAE: ICD-10-CM

## 2023-03-08 DIAGNOSIS — I10 PRIMARY HYPERTENSION: Chronic | ICD-10-CM

## 2023-03-08 DIAGNOSIS — Z00.00 MEDICARE ANNUAL WELLNESS VISIT, SUBSEQUENT: ICD-10-CM

## 2023-03-08 DIAGNOSIS — Z00.00 MEDICARE ANNUAL WELLNESS VISIT, SUBSEQUENT: Primary | ICD-10-CM

## 2023-03-08 PROBLEM — M79.671 RIGHT FOOT PAIN: Status: ACTIVE | Noted: 2022-02-14

## 2023-03-08 PROBLEM — M25.569 ARTHRALGIA OF KNEE: Status: ACTIVE | Noted: 2020-01-08

## 2023-03-08 PROCEDURE — 85027 COMPLETE CBC AUTOMATED: CPT

## 2023-03-08 PROCEDURE — 1160F RVW MEDS BY RX/DR IN RCRD: CPT | Performed by: NURSE PRACTITIONER

## 2023-03-08 PROCEDURE — G0439 PPPS, SUBSEQ VISIT: HCPCS | Performed by: NURSE PRACTITIONER

## 2023-03-08 PROCEDURE — 1126F AMNT PAIN NOTED NONE PRSNT: CPT | Performed by: NURSE PRACTITIONER

## 2023-03-08 PROCEDURE — 90677 PCV20 VACCINE IM: CPT | Performed by: NURSE PRACTITIONER

## 2023-03-08 PROCEDURE — 80053 COMPREHEN METABOLIC PANEL: CPT

## 2023-03-08 PROCEDURE — G0009 ADMIN PNEUMOCOCCAL VACCINE: HCPCS | Performed by: NURSE PRACTITIONER

## 2023-03-08 PROCEDURE — 80061 LIPID PANEL: CPT

## 2023-03-08 PROCEDURE — 1170F FXNL STATUS ASSESSED: CPT | Performed by: NURSE PRACTITIONER

## 2023-03-08 NOTE — PROGRESS NOTES
The ABCs of the Annual Wellness Visit  Subsequent Medicare Wellness Visit    Subjective      Sabiha Gonzalez is a 67 y.o. female who presents for a Subsequent Medicare Wellness Visit.    The following portions of the patient's history were reviewed and   updated as appropriate: allergies, current medications, past family history, past medical history, past social history, past surgical history and problem list.    Compared to one year ago, the patient feels her physical   health is the same.    Compared to one year ago, the patient feels her mental   health is the same.    Recent Hospitalizations:  She was not admitted to the hospital during the last year.       Current Medical Providers:  Patient Care Team:  Darlin Sierra APRN as PCP - General (Family Medicine)  Kulwinder Espinosa MD as Consulting Physician (Obstetrics and Gynecology)  Oliverio Jones MD as Consulting Physician (Cardiology)    Outpatient Medications Prior to Visit   Medication Sig Dispense Refill   • metoprolol succinate XL (TOPROL-XL) 25 MG 24 hr tablet Take 1.5 tablets by mouth Daily. 135 tablet 3   • Multiple Vitamin (MULTI VITAMIN) tablet Take  by mouth.       No facility-administered medications prior to visit.       No opioid medication identified on active medication list. I have reviewed chart for other potential  high risk medication/s and harmful drug interactions in the elderly.          Aspirin is not on active medication list.  Aspirin use is not indicated based on review of current medical condition/s. Risk of harm outweighs potential benefits.  .    Patient Active Problem List   Diagnosis   • Postmenopausal   • Genital herpes   • Polyp of urethra   • Breast cancer (HCC)   • Leg fracture, left   • Arthralgia of knee   • Right foot pain   • Primary hypertension     Advance Care Planning  Advance Directive is not on file.  ACP discussion was held with the patient during this visit. Patient does not have an advance  "directive, information provided.     Objective    Vitals:    23 1011   BP: 118/80   Pulse: 57   Temp: 98.6 °F (37 °C)   TempSrc: Infrared   SpO2: 98%   Weight: 67.5 kg (148 lb 12.8 oz)   Height: 167.6 cm (65.98\")   PainSc: 0-No pain     Estimated body mass index is 24.03 kg/m² as calculated from the following:    Height as of this encounter: 167.6 cm (65.98\").    Weight as of this encounter: 67.5 kg (148 lb 12.8 oz).    BMI is within normal parameters. No other follow-up for BMI required.      Does the patient have evidence of cognitive impairment?   No           HEALTH RISK ASSESSMENT    Smoking Status:  Social History     Tobacco Use   Smoking Status Former   • Packs/day: 0.25   • Years: 10.00   • Pack years: 2.50   • Types: Cigarettes   • Quit date: 1984   • Years since quittin.8   • Passive exposure: Past   Smokeless Tobacco Never     Alcohol Consumption:  Social History     Substance and Sexual Activity   Alcohol Use Yes    Comment: 1-2 glass wine daily     Fall Risk Screen:    STEADI Fall Risk Assessment was completed, and patient is at LOW risk for falls.Assessment completed on:3/8/2023    Depression Screening:  PHQ-2/PHQ-9 Depression Screening 3/8/2023   Little Interest or Pleasure in Doing Things 0-->not at all   Feeling Down, Depressed or Hopeless 0-->not at all   PHQ-9: Brief Depression Severity Measure Score 0       Health Habits and Functional and Cognitive Screening:  Functional & Cognitive Status 3/8/2023   Do you have difficulty preparing food and eating? No   Do you have difficulty bathing yourself, getting dressed or grooming yourself? No   Do you have difficulty using the toilet? No   Do you have difficulty moving around from place to place? No   Do you have trouble with steps or getting out of a bed or a chair? No   Current Diet Well Balanced Diet   Dental Exam Up to date   Eye Exam Up to date   Exercise (times per week) 7 times per week   Current Exercises Include Walking "   Current Exercise Activities Include -   Do you need help using the phone?  No   Are you deaf or do you have serious difficulty hearing?  No   Do you need help with transportation? No   Do you need help shopping? No   Do you need help preparing meals?  No   Do you need help with housework?  No   Do you need help with laundry? No   Do you need help taking your medications? No   Do you need help managing money? No   Do you ever drive or ride in a car without wearing a seat belt? No   Have you felt unusual stress, anger or loneliness in the last month? -   Who do you live with? -   If you need help, do you have trouble finding someone available to you? -   Have you been bothered in the last four weeks by sexual problems? -   Do you have difficulty concentrating, remembering or making decisions? -       Age-appropriate Screening Schedule:  Refer to the list below for future screening recommendations based on patient's age, sex and/or medical conditions. Orders for these recommended tests are listed in the plan section. The patient has been provided with a written plan.    Health Maintenance   Topic Date Due   • ZOSTER VACCINE (2 of 2) 12/31/2014   • DXA SCAN  03/20/2019   • MAMMOGRAM  08/10/2023   • PAP SMEAR  10/19/2023   • ANNUAL WELLNESS VISIT  03/08/2024   • COLORECTAL CANCER SCREENING  06/14/2027   • TDAP/TD VACCINES (3 - Td or Tdap) 06/11/2032   • HEPATITIS C SCREENING  Completed   • COVID-19 Vaccine  Completed   • INFLUENZA VACCINE  Completed   • Pneumococcal Vaccine 65+  Completed                CMS Preventative Services Quick Reference  Risk Factors Identified During Encounter:    Immunizations Discussed/Encouraged: Prevnar 20 (Pneumococcal 20-valent conjugate)    The above risks/problems have been discussed with the patient.  Pertinent information has been shared with the patient in the After Visit Summary.    Diagnoses and all orders for this visit:    1. Medicare annual wellness visit, subsequent  (Primary)  -     CBC (No Diff); Future  -     Comprehensive Metabolic Panel; Future  -     Lipid Panel; Future    2. Primary hypertension  Assessment & Plan:  Hypertension is Stable and controlled.  Continue current treatment regimen.  Continue current medications.  Ambulatory blood pressure monitoring.  Blood pressure will be reassessed at the next regular appointment.    Orders:  -     CBC (No Diff); Future  -     Comprehensive Metabolic Panel; Future  -     Lipid Panel; Future    3. Need for vaccination for Strep pneumoniae  -     Pneumococcal Conjugate Vaccine 20-Valent (PCV20)      Follow Up:   Next Medicare Wellness visit to be scheduled in 1 year.      An After Visit Summary and PPPS were made available to the patient.    Discussed the nature of the medical condition(s) risks, complications, implications, management, safe and proper use of medications. Encouraged medication compliance, and keeping scheduled follow up appointments with me and any other providers.     Call office or RTC if symptoms fail to improve. To ER if symptoms worsen.

## 2023-03-08 NOTE — PATIENT INSTRUCTIONS
Medicare Wellness  Personal Prevention Plan of Service     Date of Office Visit:    Encounter Provider:  JAY Pinzon  Place of Service:  [facility]  Patient Name: Sabiha Gonzalez  :  1955    As part of the Medicare Wellness portion of your visit today, we are providing you with this personalized preventive plan of services (PPPS). This plan is based upon recommendations of the [place] Preventive Services Task Force (USPSTF) and the Advisory Committee on Immunization Practices (ACIP).    This lists the preventive care services that should be considered, and provides dates of when you are due. Items listed as completed are up-to-date and do not require any further intervention.    Health Maintenance   Topic Date Due    ZOSTER VACCINE (2 of 2) 2014    DXA SCAN  2019    MAMMOGRAM  08/10/2023    PAP SMEAR  10/19/2023    ANNUAL WELLNESS VISIT  2024    COLORECTAL CANCER SCREENING  2027    TDAP/TD VACCINES (3 - Td or Tdap) 2032    HEPATITIS C SCREENING  Completed    COVID-19 Vaccine  Completed    INFLUENZA VACCINE  Completed    Pneumococcal Vaccine 65+  Completed       Orders Placed This Encounter   Procedures    Pneumococcal Conjugate Vaccine 20-Valent (PCV20)    CBC (No Diff)     Standing Status:   Future     Number of Occurrences:   1     Standing Expiration Date:   3/8/2024     Order Specific Question:   Release to patient     Answer:   Immediate    Comprehensive Metabolic Panel     Standing Status:   Future     Number of Occurrences:   1     Standing Expiration Date:   3/8/2024     Order Specific Question:   Release to patient     Answer:   Routine Release    Lipid Panel     Standing Status:   Future     Number of Occurrences:   1     Standing Expiration Date:   3/8/2024       Return if symptoms worsen or fail to improve.

## 2023-03-09 LAB
ALBUMIN SERPL-MCNC: 4.5 G/DL (ref 3.5–5.2)
ALBUMIN/GLOB SERPL: 1.5 G/DL
ALP SERPL-CCNC: 56 U/L (ref 39–117)
ALT SERPL W P-5'-P-CCNC: 15 U/L (ref 1–33)
ANION GAP SERPL CALCULATED.3IONS-SCNC: 10.9 MMOL/L (ref 5–15)
AST SERPL-CCNC: 23 U/L (ref 1–32)
BILIRUB SERPL-MCNC: 0.4 MG/DL (ref 0–1.2)
BUN SERPL-MCNC: 12 MG/DL (ref 8–23)
BUN/CREAT SERPL: 15.6 (ref 7–25)
CALCIUM SPEC-SCNC: 10.1 MG/DL (ref 8.6–10.5)
CHLORIDE SERPL-SCNC: 102 MMOL/L (ref 98–107)
CHOLEST SERPL-MCNC: 251 MG/DL (ref 0–200)
CO2 SERPL-SCNC: 26.1 MMOL/L (ref 22–29)
CREAT SERPL-MCNC: 0.77 MG/DL (ref 0.57–1)
DEPRECATED RDW RBC AUTO: 38.2 FL (ref 37–54)
EGFRCR SERPLBLD CKD-EPI 2021: 83.6 ML/MIN/1.73
ERYTHROCYTE [DISTWIDTH] IN BLOOD BY AUTOMATED COUNT: 12.9 % (ref 12.3–15.4)
GLOBULIN UR ELPH-MCNC: 3 GM/DL
GLUCOSE SERPL-MCNC: 97 MG/DL (ref 65–99)
HCT VFR BLD AUTO: 41.2 % (ref 34–46.6)
HDLC SERPL-MCNC: 111 MG/DL (ref 40–60)
HGB BLD-MCNC: 13.4 G/DL (ref 12–15.9)
LDLC SERPL CALC-MCNC: 126 MG/DL (ref 0–100)
LDLC/HDLC SERPL: 1.11 {RATIO}
MCH RBC QN AUTO: 27 PG (ref 26.6–33)
MCHC RBC AUTO-ENTMCNC: 32.5 G/DL (ref 31.5–35.7)
MCV RBC AUTO: 82.9 FL (ref 79–97)
PLATELET # BLD AUTO: 336 10*3/MM3 (ref 140–450)
PMV BLD AUTO: 9.3 FL (ref 6–12)
POTASSIUM SERPL-SCNC: 4.3 MMOL/L (ref 3.5–5.2)
PROT SERPL-MCNC: 7.5 G/DL (ref 6–8.5)
RBC # BLD AUTO: 4.97 10*6/MM3 (ref 3.77–5.28)
SODIUM SERPL-SCNC: 139 MMOL/L (ref 136–145)
TRIGL SERPL-MCNC: 84 MG/DL (ref 0–150)
VLDLC SERPL-MCNC: 14 MG/DL (ref 5–40)
WBC NRBC COR # BLD: 7.69 10*3/MM3 (ref 3.4–10.8)

## 2023-03-09 NOTE — ASSESSMENT & PLAN NOTE
Hypertension is Stable and controlled.  Continue current treatment regimen.  Continue current medications.  Ambulatory blood pressure monitoring.  Blood pressure will be reassessed at the next regular appointment.

## 2023-04-05 ENCOUNTER — TELEPHONE (OUTPATIENT)
Dept: CARDIOLOGY | Facility: CLINIC | Age: 68
End: 2023-04-05
Payer: MEDICARE

## 2023-04-05 NOTE — TELEPHONE ENCOUNTER
Dr. Jones patient    Pt states that about 2 weeks ago she started noticing slight PRICE and fatigue. Pt states that last night her palpitations increased, constant last night, through the night, and intermittent today. Pt has had palps for awhile but symptoms seem to have increased in the past two weeks. Pt reports not other changes/trigger.     Pt had weaned her Toprol XL to 25 mg daily, but increased back to current dose of 37.5 mg daily about two weeks ago, when symptoms started.    BP runs 120s/70s HR 60s-70s      Please advise.

## 2023-04-06 NOTE — TELEPHONE ENCOUNTER
Jacob Alvarado MD Hurley, Maribel Hodge, RN  Caller: Unspecified (Yesterday,  2:17 PM)  If the palpitations persist through tomorrow, have her come in on Friday and place a 7-day heart monitor.  Continue metoprolol at current dosing.   ______________________________________________________      Called pt and gave MJS recommendations above. Pt verbalizes understanding and agreeable to plan.

## 2023-06-13 ENCOUNTER — OFFICE VISIT (OUTPATIENT)
Dept: CARDIOLOGY | Facility: CLINIC | Age: 68
End: 2023-06-13
Payer: MEDICARE

## 2023-06-13 VITALS
HEART RATE: 79 BPM | RESPIRATION RATE: 18 BRPM | BODY MASS INDEX: 22.98 KG/M2 | DIASTOLIC BLOOD PRESSURE: 60 MMHG | OXYGEN SATURATION: 99 % | WEIGHT: 143 LBS | HEIGHT: 66 IN | SYSTOLIC BLOOD PRESSURE: 98 MMHG

## 2023-06-13 DIAGNOSIS — I49.3 PVC (PREMATURE VENTRICULAR CONTRACTION): ICD-10-CM

## 2023-06-13 DIAGNOSIS — I49.1 PAC (PREMATURE ATRIAL CONTRACTION): ICD-10-CM

## 2023-06-13 DIAGNOSIS — R00.2 PALPITATIONS: ICD-10-CM

## 2023-06-13 DIAGNOSIS — R94.31 ABNORMAL ELECTROCARDIOGRAM (ECG) (EKG): ICD-10-CM

## 2023-06-13 PROCEDURE — 99214 OFFICE O/P EST MOD 30 MIN: CPT | Performed by: INTERNAL MEDICINE

## 2023-06-13 PROCEDURE — 3074F SYST BP LT 130 MM HG: CPT | Performed by: INTERNAL MEDICINE

## 2023-06-13 PROCEDURE — 3078F DIAST BP <80 MM HG: CPT | Performed by: INTERNAL MEDICINE

## 2023-06-13 RX ORDER — METOPROLOL SUCCINATE 50 MG/1
50 TABLET, EXTENDED RELEASE ORAL DAILY
Qty: 30 TABLET | Refills: 6 | Status: SHIPPED | OUTPATIENT
Start: 2023-06-13

## 2023-11-22 DIAGNOSIS — I49.3 PVC (PREMATURE VENTRICULAR CONTRACTION): ICD-10-CM

## 2023-11-22 DIAGNOSIS — I49.1 PAC (PREMATURE ATRIAL CONTRACTION): ICD-10-CM

## 2023-11-22 DIAGNOSIS — R00.2 PALPITATIONS: ICD-10-CM

## 2023-11-22 RX ORDER — METOPROLOL SUCCINATE 50 MG/1
50 TABLET, EXTENDED RELEASE ORAL DAILY
Qty: 90 TABLET | Refills: 3 | Status: SHIPPED | OUTPATIENT
Start: 2023-11-22

## 2024-01-23 ENCOUNTER — OFFICE VISIT (OUTPATIENT)
Dept: CARDIOLOGY | Facility: CLINIC | Age: 69
End: 2024-01-23
Payer: MEDICARE

## 2024-01-23 VITALS
BODY MASS INDEX: 23.78 KG/M2 | HEIGHT: 66 IN | SYSTOLIC BLOOD PRESSURE: 118 MMHG | WEIGHT: 148 LBS | OXYGEN SATURATION: 96 % | DIASTOLIC BLOOD PRESSURE: 68 MMHG | HEART RATE: 71 BPM

## 2024-01-23 DIAGNOSIS — I10 PRIMARY HYPERTENSION: Primary | Chronic | ICD-10-CM

## 2024-01-23 PROCEDURE — 3074F SYST BP LT 130 MM HG: CPT | Performed by: INTERNAL MEDICINE

## 2024-01-23 PROCEDURE — 99214 OFFICE O/P EST MOD 30 MIN: CPT | Performed by: INTERNAL MEDICINE

## 2024-01-23 PROCEDURE — 3078F DIAST BP <80 MM HG: CPT | Performed by: INTERNAL MEDICINE

## 2024-01-23 RX ORDER — SODIUM PHOSPHATE,MONO-DIBASIC 19G-7G/118
1 ENEMA (ML) RECTAL 3 TIMES DAILY
COMMUNITY

## 2024-01-23 NOTE — PROGRESS NOTES
[facility] Cardiology  Follow Up Visit  Sabiha Gonzalez  1955    VISIT DATE:  01/23/24    PCP:   Darlin Sierra APRN  [address] [address]          CC:  Palpitations (6-Mo F/U)      Problem List:  1.  Mild valvular heart disease  2.  PVCs  3.  Prior Breast Cancer  -approx 2010  -treated with cytoxan  -Her 2 positive  -right mastectomy, no radiation     Cardiac testing:     Echo April 2022:  Calculated left ventricular EF = 60% Left ventricular systolic function is normal.  Mild aortic valve regurgitation is present  Mild mitral valve regurgitation is present.  Mild tricuspid valve regurgitation is present. Estimated right ventricular systolic pressure from tricuspid regurgitation is normal (<35 mmHg).     Holter monitor: Rare PVCs    Stress Test:  July 2023    Patient denied chest pain.    Breast attenuation artifact is present.    Expected esercise time 5:50   Actual time 5:51 normal exercise capacity.    Myocardial perfusion imaging indicates a normal myocardial perfusion study with no evidence of ischemia.    Left ventricular ejection fraction is hyperdynamic (Calculated EF > 70%).    there is no significant coronary artery calcification.    Impressions are consistent with a low risk study.       History of Present Illness:  Sabiha Gonzalez  Is a 68 y.o. female with pertinent cardiac history detailed above.  Patient following up for PVCs.  She is on metoprolol.  This is controlling things well.  She has no current cardiac complaints.  We did discuss repeating lipids this year we will plan on getting these in March at the time of her next physical.  Blood pressure is well-controlled.  Reviewed her stress test results from last year.      Patient Active Problem List    Diagnosis Date Noted    Primary hypertension 03/08/2023    Right foot pain 02/14/2022    Leg fracture, left     Arthralgia of knee 01/08/2020    Polyp of urethra 12/06/2017    Breast cancer   "   Postmenopausal 03/15/2017    Genital herpes 03/15/2017       No Known Allergies    Social History     Socioeconomic History    Marital status: Single   Tobacco Use    Smoking status: Former     Packs/day: 0.25     Years: 10.00     Additional pack years: 0.00     Total pack years: 2.50     Types: Cigarettes     Quit date: 1984     Years since quittin.7     Passive exposure: Past    Smokeless tobacco: Never   Vaping Use    Vaping Use: Never used   Substance and Sexual Activity    Alcohol use: Yes     Alcohol/week: 2.0 standard drinks of alcohol     Types: 2 Glasses of wine per week     Comment: 1-2 glass wine daily    Drug use: Never    Sexual activity: Not Currently       Family History   Problem Relation Age of Onset    Heart disease Mother     Diabetes Mother     Breast cancer Mother     Stroke Father     Heart disease Brother 55    Diabetes Brother     Atrial fibrillation Brother     Other Brother         Covid    COPD Brother     No Known Problems Maternal Grandmother     No Known Problems Maternal Grandfather     No Known Problems Paternal Grandmother     No Known Problems Paternal Grandfather     Ovarian cancer Neg Hx     Uterine cancer Neg Hx     Colon cancer Neg Hx        Current Medications:    Current Outpatient Medications:     glucosamine-chondroitin 500-400 MG capsule capsule, Take 1 capsule by mouth 3 times a day., Disp: , Rfl:     metoprolol succinate XL (TOPROL-XL) 50 MG 24 hr tablet, TAKE 1 TABLET BY MOUTH DAILY, Disp: 90 tablet, Rfl: 3    Multiple Vitamin (MULTI VITAMIN) tablet, Take  by mouth., Disp: , Rfl:      Review of Systems   Cardiovascular: Negative.    Respiratory: Negative.         Vitals:    24 1257   BP: 118/68   BP Location: Left arm   Patient Position: Sitting   Cuff Size: Adult   Pulse: 71   SpO2: 96%   Weight: 67.1 kg (148 lb)   Height: 167.6 cm (66\")       Physical Exam  Constitutional:       Appearance: Normal appearance.   Cardiovascular:      Rate and Rhythm: " "Normal rate and regular rhythm.      Pulses: Normal pulses.      Heart sounds: Normal heart sounds.   Pulmonary:      Effort: Pulmonary effort is normal.      Breath sounds: Normal breath sounds.   Musculoskeletal:      Right lower leg: No edema.      Left lower leg: No edema.   Neurological:      Mental Status: She is alert.         Diagnostic Data:  Procedures  Lab Results   Component Value Date    TRIG 84 03/08/2023     (H) 03/08/2023     Lab Results   Component Value Date    GLUCOSE 97 03/08/2023    BUN 12 03/08/2023    CREATININE 0.77 03/08/2023     03/08/2023    K 4.3 03/08/2023     03/08/2023    CO2 26.1 03/08/2023     No results found for: \"HGBA1C\"  Lab Results   Component Value Date    WBC 7.69 03/08/2023    HGB 13.4 03/08/2023    HCT 41.2 03/08/2023     03/08/2023       Assessment:   Diagnosis Plan   1. Primary hypertension  Lipid Panel          Plan:    1.  Mild valvular disease  -EF 60%.  We will monitor with periodic echo.  Can repeat in 2025    2.  PVCs  -Symptoms are well-controlled on Toprol-XL 50 mg  -Stress test July 2023: No ischemia, EF greater than 70%.  No coronary artery calcification     3.  Healthcare maintenance  Total cholesterol 251, ,   ASCVD risk is low at 4.8%.  Will repeat lipids in March 2024 and reassess need for statin therapy    4.  History of Breast Ca  -treated approx 2010  -Ejection fraction is preserved    ACP discussion was held with the patient during this visit. Patient does not have an advance directive, declines further assistance.      Oliverio Jones MD FACC     "

## 2024-03-05 ENCOUNTER — TELEPHONE (OUTPATIENT)
Dept: CARDIOLOGY | Facility: CLINIC | Age: 69
End: 2024-03-05
Payer: MEDICARE

## 2024-03-05 RX ORDER — FLECAINIDE ACETATE 50 MG/1
50 TABLET ORAL 2 TIMES DAILY
Qty: 180 TABLET | Refills: 1 | Status: SHIPPED | OUTPATIENT
Start: 2024-03-05

## 2024-03-05 NOTE — TELEPHONE ENCOUNTER
Patient called and states that her PVCs have increased the past 4 days. Patient states that she started feeling weak/tired and that she did not have usual stamina. Pt check HR manually and it was in the 40s, so she checked her heart rate with Em[employer] device with showed occasional single PVCs, but mostly bigeminy and trigeminy. She is having PVCs throughout the day, almost constant. Patient takes Toprol XL 50 mg daily and has taken extra dose 3/2 and 3/4 (at night) which did not seem to help much. She said that she will wake up in NSR but as soon as she becomes active, PVCs start.     BP 120s-130s/70s-80s    Patient denies any lifestyle changes, chest pain, SOB, dizziness.      Patient has strips from Emay device but is unable to upload them to [employer]. She said she could bring them to the office if needed.    Please advise.

## 2024-03-05 NOTE — TELEPHONE ENCOUNTER
Patient had a normal stress test in July 2023.  She is a candidate for flecainide 50 mg twice a day if she would like to try this to suppress PVCs further.  If she chooses to start this would have her come in for an EKG on Friday

## 2024-03-08 ENCOUNTER — TELEPHONE (OUTPATIENT)
Dept: CARDIOLOGY | Facility: CLINIC | Age: 69
End: 2024-03-08

## 2024-03-08 ENCOUNTER — CLINICAL SUPPORT (OUTPATIENT)
Dept: CARDIOLOGY | Facility: CLINIC | Age: 69
End: 2024-03-08
Payer: MEDICARE

## 2024-03-08 DIAGNOSIS — I10 PRIMARY HYPERTENSION: Primary | ICD-10-CM

## 2024-03-08 NOTE — TELEPHONE ENCOUNTER
Patient came in for EKG after starting flecainide earlier this week. EKG reviewed by NSK.    Patient has concerns regarding flecainide. Pt states that the medication has helped her symptoms, but she is concerned with side effects, specifically interstitial lung disease and fatal arrhythmia.     Please advise.

## 2024-03-08 NOTE — TELEPHONE ENCOUNTER
I do not think there is any significant risk of interstitial lung disease.  The risk of abnormal arrhythmias with flecainide are in patients with significant coronary artery disease.  She does not have any history of this so I think it is safe for use

## 2024-04-22 ENCOUNTER — OFFICE VISIT (OUTPATIENT)
Dept: FAMILY MEDICINE CLINIC | Facility: CLINIC | Age: 69
End: 2024-04-22
Payer: MEDICARE

## 2024-04-22 ENCOUNTER — LAB (OUTPATIENT)
Dept: LAB | Facility: HOSPITAL | Age: 69
End: 2024-04-22
Payer: MEDICARE

## 2024-04-22 VITALS
SYSTOLIC BLOOD PRESSURE: 132 MMHG | BODY MASS INDEX: 23.77 KG/M2 | WEIGHT: 147.9 LBS | DIASTOLIC BLOOD PRESSURE: 74 MMHG | HEIGHT: 66 IN | TEMPERATURE: 97.9 F | OXYGEN SATURATION: 98 % | HEART RATE: 53 BPM

## 2024-04-22 DIAGNOSIS — Z83.3 FAMILY HISTORY OF DIABETES MELLITUS: ICD-10-CM

## 2024-04-22 DIAGNOSIS — I10 PRIMARY HYPERTENSION: Chronic | ICD-10-CM

## 2024-04-22 DIAGNOSIS — Z00.00 MEDICARE ANNUAL WELLNESS VISIT, SUBSEQUENT: Primary | ICD-10-CM

## 2024-04-22 DIAGNOSIS — E78.2 MIXED HYPERLIPIDEMIA: ICD-10-CM

## 2024-04-22 DIAGNOSIS — Z13.820 ENCOUNTER FOR OSTEOPOROSIS SCREENING IN ASYMPTOMATIC POSTMENOPAUSAL PATIENT: ICD-10-CM

## 2024-04-22 DIAGNOSIS — Z78.0 ENCOUNTER FOR OSTEOPOROSIS SCREENING IN ASYMPTOMATIC POSTMENOPAUSAL PATIENT: ICD-10-CM

## 2024-04-22 DIAGNOSIS — I10 PRIMARY HYPERTENSION: ICD-10-CM

## 2024-04-22 LAB
ALBUMIN SERPL-MCNC: 4.6 G/DL (ref 3.5–5.2)
ALBUMIN/GLOB SERPL: 2 G/DL
ALP SERPL-CCNC: 58 U/L (ref 39–117)
ALT SERPL W P-5'-P-CCNC: 19 U/L (ref 1–33)
ANION GAP SERPL CALCULATED.3IONS-SCNC: 10 MMOL/L (ref 5–15)
AST SERPL-CCNC: 17 U/L (ref 1–32)
BILIRUB SERPL-MCNC: 0.3 MG/DL (ref 0–1.2)
BILIRUB UR QL STRIP: NEGATIVE
BUN SERPL-MCNC: 14 MG/DL (ref 8–23)
BUN/CREAT SERPL: 17.9 (ref 7–25)
CALCIUM SPEC-SCNC: 9.3 MG/DL (ref 8.6–10.5)
CHLORIDE SERPL-SCNC: 101 MMOL/L (ref 98–107)
CHOLEST SERPL-MCNC: 195 MG/DL (ref 0–200)
CLARITY UR: CLEAR
CO2 SERPL-SCNC: 26 MMOL/L (ref 22–29)
COLOR UR: YELLOW
CREAT SERPL-MCNC: 0.78 MG/DL (ref 0.57–1)
DEPRECATED RDW RBC AUTO: 40.1 FL (ref 37–54)
EGFRCR SERPLBLD CKD-EPI 2021: 81.8 ML/MIN/1.73
ERYTHROCYTE [DISTWIDTH] IN BLOOD BY AUTOMATED COUNT: 13.4 % (ref 12.3–15.4)
GLOBULIN UR ELPH-MCNC: 2.3 GM/DL
GLUCOSE SERPL-MCNC: 75 MG/DL (ref 65–99)
GLUCOSE UR STRIP-MCNC: NEGATIVE MG/DL
HBA1C MFR BLD: 5.7 % (ref 4.8–5.6)
HCT VFR BLD AUTO: 38.4 % (ref 34–46.6)
HDLC SERPL-MCNC: 81 MG/DL (ref 40–60)
HGB BLD-MCNC: 12.6 G/DL (ref 12–15.9)
HGB UR QL STRIP.AUTO: NEGATIVE
HOLD SPECIMEN: NORMAL
KETONES UR QL STRIP: NEGATIVE
LDLC SERPL CALC-MCNC: 101 MG/DL (ref 0–100)
LDLC/HDLC SERPL: 1.22 {RATIO}
LEUKOCYTE ESTERASE UR QL STRIP.AUTO: NEGATIVE
MCH RBC QN AUTO: 27 PG (ref 26.6–33)
MCHC RBC AUTO-ENTMCNC: 32.8 G/DL (ref 31.5–35.7)
MCV RBC AUTO: 82.2 FL (ref 79–97)
NITRITE UR QL STRIP: NEGATIVE
PH UR STRIP.AUTO: 6 [PH] (ref 5–8)
PLATELET # BLD AUTO: 272 10*3/MM3 (ref 140–450)
PMV BLD AUTO: 9.7 FL (ref 6–12)
POTASSIUM SERPL-SCNC: 4.7 MMOL/L (ref 3.5–5.2)
PROT SERPL-MCNC: 6.9 G/DL (ref 6–8.5)
PROT UR QL STRIP: NEGATIVE
RBC # BLD AUTO: 4.67 10*6/MM3 (ref 3.77–5.28)
SODIUM SERPL-SCNC: 137 MMOL/L (ref 136–145)
SP GR UR STRIP: 1.01 (ref 1–1.03)
TRIGL SERPL-MCNC: 74 MG/DL (ref 0–150)
TSH SERPL DL<=0.05 MIU/L-ACNC: 2.76 UIU/ML (ref 0.27–4.2)
UROBILINOGEN UR QL STRIP: NORMAL
VLDLC SERPL-MCNC: 13 MG/DL (ref 5–40)
WBC NRBC COR # BLD AUTO: 7.16 10*3/MM3 (ref 3.4–10.8)

## 2024-04-22 PROCEDURE — 80053 COMPREHEN METABOLIC PANEL: CPT

## 2024-04-22 PROCEDURE — 83036 HEMOGLOBIN GLYCOSYLATED A1C: CPT

## 2024-04-22 PROCEDURE — 80061 LIPID PANEL: CPT

## 2024-04-22 PROCEDURE — 84443 ASSAY THYROID STIM HORMONE: CPT

## 2024-04-22 PROCEDURE — 3075F SYST BP GE 130 - 139MM HG: CPT | Performed by: NURSE PRACTITIONER

## 2024-04-22 PROCEDURE — G0439 PPPS, SUBSEQ VISIT: HCPCS | Performed by: NURSE PRACTITIONER

## 2024-04-22 PROCEDURE — 1159F MED LIST DOCD IN RCRD: CPT | Performed by: NURSE PRACTITIONER

## 2024-04-22 PROCEDURE — 3078F DIAST BP <80 MM HG: CPT | Performed by: NURSE PRACTITIONER

## 2024-04-22 PROCEDURE — 81003 URINALYSIS AUTO W/O SCOPE: CPT

## 2024-04-22 PROCEDURE — 1160F RVW MEDS BY RX/DR IN RCRD: CPT | Performed by: NURSE PRACTITIONER

## 2024-04-22 PROCEDURE — 85027 COMPLETE CBC AUTOMATED: CPT

## 2024-04-22 NOTE — PROGRESS NOTES
"The ABCs of the Annual Wellness Visit  Subsequent Medicare Wellness Visit    Subjective    Sabiha Gonzalez is a 68 y.o. female who presents for a Subsequent Medicare Wellness Visit.    The following portions of the patient's history were reviewed and   updated as appropriate: {history reviewed:20406::\"allergies\",\"current medications\",\"past family history\",\"past medical history\",\"past social history\",\"past surgical history\",\"problem list\"}.    Compared to one year ago, the patient feels her physical   health is {better worse same:48359}.    Compared to one year ago, the patient feels her mental   health is {better worse same:10044}.    Recent Hospitalizations:  {Hospital Admission Status in the last 365 days:28253}      Current Medical Providers:  Patient Care Team:  Darlin Sierra APRN as PCP - General (Family Medicine)  Kulwinder Espinosa MD as Consulting Physician (Obstetrics and Gynecology)  Oliverio Jones MD as Consulting Physician (Cardiology)    Outpatient Medications Prior to Visit   Medication Sig Dispense Refill    flecainide (TAMBOCOR) 50 MG tablet Take 1 tablet by mouth 2 (Two) Times a Day. 180 tablet 1    metoprolol succinate XL (TOPROL-XL) 50 MG 24 hr tablet TAKE 1 TABLET BY MOUTH DAILY 90 tablet 3    Multiple Vitamin (MULTI VITAMIN) tablet Take  by mouth.      glucosamine-chondroitin 500-400 MG capsule capsule Take 1 capsule by mouth 3 times a day. (Patient not taking: Reported on 4/22/2024)       No facility-administered medications prior to visit.       No opioid medication identified on active medication list. I have reviewed chart for other potential  high risk medication/s and harmful drug interactions in the elderly.        Aspirin is not on active medication list.  {ASPIRIN NOT ON MEDICATION LIST INDICATED/NOT INDICATED:87525}.    Patient Active Problem List   Diagnosis    Postmenopausal    Genital herpes    Polyp of urethra    Breast cancer    Leg fracture, left    Arthralgia of " "knee    Right foot pain    Primary hypertension     Advance Care Planning   Advance Care Planning     Advance Directive is not on file.  {ACP Discussion, Advance Directive not in EMR:72861}     Objective    Vitals:    24 0858   BP: 132/74   Pulse: 53   Temp: 97.9 °F (36.6 °C)   TempSrc: Infrared   SpO2: 98%   Weight: 67.1 kg (147 lb 14.4 oz)   Height: 167.6 cm (65.98\")   PainSc: 0-No pain     Estimated body mass index is 23.88 kg/m² as calculated from the following:    Height as of this encounter: 167.6 cm (65.98\").    Weight as of this encounter: 67.1 kg (147 lb 14.4 oz).    BMI is within normal parameters. No other follow-up for BMI required.      Does the patient have evidence of cognitive impairment? {Yes/No:11415}          HEALTH RISK ASSESSMENT    Smoking Status:  Social History     Tobacco Use   Smoking Status Former    Current packs/day: 0.00    Average packs/day: 0.3 packs/day for 10.0 years (2.5 ttl pk-yrs)    Types: Cigarettes    Start date: 1974    Quit date: 1984    Years since quittin.9    Passive exposure: Past   Smokeless Tobacco Never     Alcohol Consumption:  Social History     Substance and Sexual Activity   Alcohol Use Yes    Alcohol/week: 2.0 standard drinks of alcohol    Types: 2 Glasses of wine per week    Comment: 1-2 glass wine daily     Fall Risk Screen:    RUELADI Fall Risk Assessment was completed, and patient is at LOW risk for falls.Assessment completed on:2024    Depression Screenin/22/2024     8:59 AM   PHQ-2/PHQ-9 Depression Screening   Little Interest or Pleasure in Doing Things 0-->not at all   Feeling Down, Depressed or Hopeless 0-->not at all   PHQ-9: Brief Depression Severity Measure Score 0       Health Habits and Functional and Cognitive Screenin/22/2024     9:00 AM   Functional & Cognitive Status   Do you have difficulty preparing food and eating? No   Do you have difficulty bathing yourself, getting dressed or grooming yourself? No "   Do you have difficulty using the toilet? No   Do you have difficulty moving around from place to place? No   Do you have trouble with steps or getting out of a bed or a chair? No   Current Diet Well Balanced Diet   Dental Exam Up to date   Eye Exam Up to date   Exercise (times per week) 7 times per week   Current Exercises Include Walking   Do you need help using the phone?  No   Are you deaf or do you have serious difficulty hearing?  No   Do you need help to go to places out of walking distance? No   Do you need help shopping? No   Do you need help preparing meals?  No   Do you need help with housework?  No   Do you need help with laundry? No   Do you need help taking your medications? No   Do you need help managing money? No   Do you ever drive or ride in a car without wearing a seat belt? No   Have you felt unusual stress, anger or loneliness in the last month? No   Who do you live with? Alone   If you need help, do you have trouble finding someone available to you? No   Have you been bothered in the last four weeks by sexual problems? No   Do you have difficulty concentrating, remembering or making decisions? No       Age-appropriate Screening Schedule:  Refer to the list below for future screening recommendations based on patient's age, sex and/or medical conditions. Orders for these recommended tests are listed in the plan section. The patient has been provided with a written plan.    Health Maintenance   Topic Date Due    DXA SCAN  03/20/2019    PAP SMEAR  10/19/2023    ZOSTER VACCINE (2 of 2) 04/22/2024 (Originally 12/31/2014)    RSV Vaccine - Adults (1 - 1-dose 60+ series) 04/22/2025 (Originally 9/22/2013)    INFLUENZA VACCINE  08/01/2024    MAMMOGRAM  08/11/2024    ANNUAL WELLNESS VISIT  04/22/2025    COLORECTAL CANCER SCREENING  06/14/2027    TDAP/TD VACCINES (3 - Td or Tdap) 06/11/2032    HEPATITIS C SCREENING  Completed    COVID-19 Vaccine  Completed    Pneumococcal Vaccine 65+  Completed             "      CMS Preventative Services Quick Reference  Risk Factors Identified During Encounter  {Medicare Wellness Risk Factors:88556}  The above risks/problems have been discussed with the patient.  Pertinent information has been shared with the patient in the After Visit Summary.  An After Visit Summary and PPPS were made available to the patient.    Follow Up:   Next Medicare Wellness visit to be scheduled in 1 year.   {Wrapup  Review (Popup)  Advance Care Planning  Labs  CC  Problem List  Visit Diagnosis  Medications  Result Review  Imaging  Health Maintenance  Quality  BestPractice  SmartSets  SnapShot  Encounters  Notes  Media  Procedures :23}    Additional E&M Note during same encounter follows:  Patient has multiple medical problems which are significant and separately identifiable that require additional work above and beyond the Medicare Wellness Visit.      Chief Complaint  Annual Exam    Subjective    {Problem List  Visit Diagnosis   Encounters  Notes  Medications  Labs  Result Review Imaging  Media :23}    HPI  Sabiha Gonzalez is also being seen today for ***         Objective   Vital Signs:  /74   Pulse 53   Temp 97.9 °F (36.6 °C) (Infrared)   Ht 167.6 cm (65.98\")   Wt 67.1 kg (147 lb 14.4 oz)   SpO2 98%   BMI 23.88 kg/m²     Physical Exam     {The following data was reviewed by (Optional):87559}  {Ambulatory Labs (Optional):33770}  {Data reviewed (Optional):73421:::1}           Assessment and Plan {CC Problem List  Visit Diagnosis   ROS  Review (Popup)  [facility]  Quality  BestPractice  Medications  SmartSets  SnapShot Encounters  Media :23}  There are no diagnoses linked to this encounter.       {Time Spent (Optional):20793}  Follow Up {Instructions Charge Capture  Follow-up Communications :23}  No follow-ups on file.  Patient was given instructions and counseling regarding her condition or for health maintenance advice. Please see " specific information pulled into the AVS if appropriate.

## 2024-04-22 NOTE — PROGRESS NOTES
The ABCs of the Annual Wellness Visit  Subsequent Medicare Wellness Visit    Subjective      Sabiha Gonzalez is a 68 y.o. female who presents for a Subsequent Medicare Wellness Visit.    The following portions of the patient's history were reviewed and   updated as appropriate: allergies, current medications, past family history, past medical history, past social history, past surgical history, and problem list.    Compared to one year ago, the patient feels her physical   health is the same.    Compared to one year ago, the patient feels her mental   health is the same.    Recent Hospitalizations:  She was not admitted to the hospital during the last year.       Current Medical Providers:  Patient Care Team:  Darlin Sierra APRN as PCP - General (Family Medicine)  Kulwinder Espinosa MD as Consulting Physician (Obstetrics and Gynecology)  Oliverio Jones MD as Consulting Physician (Cardiology)    Outpatient Medications Prior to Visit   Medication Sig Dispense Refill    flecainide (TAMBOCOR) 50 MG tablet Take 1 tablet by mouth 2 (Two) Times a Day. 180 tablet 1    metoprolol succinate XL (TOPROL-XL) 50 MG 24 hr tablet TAKE 1 TABLET BY MOUTH DAILY 90 tablet 3    Multiple Vitamin (MULTI VITAMIN) tablet Take  by mouth.      glucosamine-chondroitin 500-400 MG capsule capsule Take 1 capsule by mouth 3 times a day. (Patient not taking: Reported on 4/22/2024)       No facility-administered medications prior to visit.       No opioid medication identified on active medication list. I have reviewed chart for other potential  high risk medication/s and harmful drug interactions in the elderly.        Aspirin is not on active medication list.  Aspirin use is not indicated based on review of current medical condition/s. Risk of harm outweighs potential benefits.  .    Patient Active Problem List   Diagnosis    Postmenopausal    Genital herpes    Polyp of urethra    Breast cancer    Leg fracture, left    Arthralgia of  "knee    Right foot pain    Primary hypertension    Primary malignant neoplasm of female breast     Advance Care Planning   Advance Care Planning     Advance Directive is not on file.  ACP discussion was held with the patient during this visit. Patient does not have an advance directive, information provided.     Objective    Vitals:    24 0858   BP: 132/74   Pulse: 53   Temp: 97.9 °F (36.6 °C)   TempSrc: Infrared   SpO2: 98%   Weight: 67.1 kg (147 lb 14.4 oz)   Height: 167.6 cm (65.98\")   PainSc: 0-No pain     Estimated body mass index is 23.88 kg/m² as calculated from the following:    Height as of this encounter: 167.6 cm (65.98\").    Weight as of this encounter: 67.1 kg (147 lb 14.4 oz).    BMI is within normal parameters. No other follow-up for BMI required.      Does the patient have evidence of cognitive impairment?   No           HEALTH RISK ASSESSMENT    Smoking Status:  Social History     Tobacco Use   Smoking Status Former    Current packs/day: 0.00    Average packs/day: 0.3 packs/day for 10.0 years (2.5 ttl pk-yrs)    Types: Cigarettes    Start date: 1974    Quit date: 1984    Years since quittin.9    Passive exposure: Past   Smokeless Tobacco Never     Alcohol Consumption:  Social History     Substance and Sexual Activity   Alcohol Use Yes    Alcohol/week: 2.0 standard drinks of alcohol    Types: 2 Glasses of wine per week    Comment: 1-2 glass wine daily     Fall Risk Screen:    DWAYNE Fall Risk Assessment was completed, and patient is at LOW risk for falls.Assessment completed on:2024    Depression Screenin/22/2024     8:59 AM   PHQ-2/PHQ-9 Depression Screening   Little Interest or Pleasure in Doing Things 0-->not at all   Feeling Down, Depressed or Hopeless 0-->not at all   PHQ-9: Brief Depression Severity Measure Score 0       Health Habits and Functional and Cognitive Screenin/22/2024     9:00 AM   Functional & Cognitive Status   Do you have difficulty " preparing food and eating? No   Do you have difficulty bathing yourself, getting dressed or grooming yourself? No   Do you have difficulty using the toilet? No   Do you have difficulty moving around from place to place? No   Do you have trouble with steps or getting out of a bed or a chair? No   Current Diet Well Balanced Diet   Dental Exam Up to date   Eye Exam Up to date   Exercise (times per week) 7 times per week   Current Exercises Include Walking   Do you need help using the phone?  No   Are you deaf or do you have serious difficulty hearing?  No   Do you need help to go to places out of walking distance? No   Do you need help shopping? No   Do you need help preparing meals?  No   Do you need help with housework?  No   Do you need help with laundry? No   Do you need help taking your medications? No   Do you need help managing money? No   Do you ever drive or ride in a car without wearing a seat belt? No   Have you felt unusual stress, anger or loneliness in the last month? No   Who do you live with? Alone   If you need help, do you have trouble finding someone available to you? No   Have you been bothered in the last four weeks by sexual problems? No   Do you have difficulty concentrating, remembering or making decisions? No       Age-appropriate Screening Schedule:  Refer to the list below for future screening recommendations based on patient's age, sex and/or medical conditions. Orders for these recommended tests are listed in the plan section. The patient has been provided with a written plan.    Health Maintenance   Topic Date Due    ZOSTER VACCINE (2 of 2) 12/31/2014    DXA SCAN  03/20/2019    PAP SMEAR  10/19/2023    RSV Vaccine - Adults (1 - 1-dose 60+ series) 04/22/2025 (Originally 9/22/2013)    INFLUENZA VACCINE  08/01/2024    MAMMOGRAM  08/11/2024    ANNUAL WELLNESS VISIT  04/22/2025    LIPID PANEL  04/22/2025    COLORECTAL CANCER SCREENING  06/14/2027    TDAP/TD VACCINES (3 - Td or Tdap) 06/11/2032     HEPATITIS C SCREENING  Completed    COVID-19 Vaccine  Completed    Pneumococcal Vaccine 65+  Completed                  CMS Preventative Services Quick Reference  Risk Factors Identified During Encounter:    Immunizations Discussed/Encouraged: Hepatitis A Vaccine/Series and Shingrix    The above risks/problems have been discussed with the patient.  Pertinent information has been shared with the patient in the After Visit Summary.    Diagnoses and all orders for this visit:    1. Medicare annual wellness visit, subsequent (Primary)  -     Lipid Panel; Future    2. Primary hypertension  -     Comprehensive Metabolic Panel; Future  -     CBC (No Diff); Future  -     TSH; Future  -     Urinalysis With Culture If Indicated - Urine, Clean Catch; Future    3. Mixed hyperlipidemia  -     Lipid Panel; Future    4. Encounter for osteoporosis screening in asymptomatic postmenopausal patient  -     DEXA Bone Density Axial    5. Family history of diabetes mellitus  -     Hemoglobin A1c; Future        Follow Up:   Next Medicare Wellness visit to be scheduled in 1 year.      An After Visit Summary and PPPS were made available to the patient.    Discussed the nature of the medical condition(s) risks, complications, implications, management, safe and proper use of medications. Encouraged medication compliance, and keeping scheduled follow up appointments with me and any other providers.     Call office or RTC if symptoms fail to improve. To ER if symptoms worsen.

## 2024-04-26 NOTE — PATIENT INSTRUCTIONS
Medicare Wellness  Personal Prevention Plan of Service     Date of Office Visit:    Encounter Provider:  JAY Pinzon  Place of Service:  [facility]  Patient Name: Sabiha Gonzalez  :  1955    As part of the Medicare Wellness portion of your visit today, we are providing you with this personalized preventive plan of services (PPPS). This plan is based upon recommendations of the [place] Preventive Services Task Force (USPSTF) and the Advisory Committee on Immunization Practices (ACIP).    This lists the preventive care services that should be considered, and provides dates of when you are due. Items listed as completed are up-to-date and do not require any further intervention.    Health Maintenance   Topic Date Due    ZOSTER VACCINE (2 of 2) 2014    DXA SCAN  2019    PAP SMEAR  10/19/2023    RSV Vaccine - Adults (1 - 1-dose 60+ series) 2025 (Originally 2013)    INFLUENZA VACCINE  2024    MAMMOGRAM  2024    ANNUAL WELLNESS VISIT  2025    LIPID PANEL  2025    COLORECTAL CANCER SCREENING  2027    TDAP/TD VACCINES (3 - Td or Tdap) 2032    HEPATITIS C SCREENING  Completed    COVID-19 Vaccine  Completed    Pneumococcal Vaccine 65+  Completed       Orders Placed This Encounter   Procedures    DEXA Bone Density Axial     Order Specific Question:   Is patient taking or have taken long term Glucocorticoid (steroids)?     Answer:   No     Order Specific Question:   Does the patient have rheumatoid arthritis?     Answer:   No     Order Specific Question:   Does the patient have secondary osteoporosis?     Answer:   No     Order Specific Question:   Reason for Exam:     Answer:   screening for osteoporosis     Order Specific Question:   Does this patient have a diabetic monitoring/medication delivering device on?     Answer:   No     Order Specific Question:   Release to patient     Answer:   Routine Release  [[id number]]    Comprehensive Metabolic Panel     Standing Status:   Future     Number of Occurrences:   1     Standing Expiration Date:   4/22/2025     Order Specific Question:   Release to patient     Answer:   Routine Release [[id number]]    CBC (No Diff)     Standing Status:   Future     Number of Occurrences:   1     Standing Expiration Date:   4/22/2025     Order Specific Question:   Release to patient     Answer:   Routine Release [[id number]]    Lipid Panel     Standing Status:   Future     Number of Occurrences:   1     Standing Expiration Date:   4/22/2025     Order Specific Question:   Release to patient     Answer:   Routine Release [[id number]]    Hemoglobin A1c     Standing Status:   Future     Number of Occurrences:   1     Standing Expiration Date:   4/22/2025     Order Specific Question:   Release to patient     Answer:   Routine Release [[id number]]    TSH     Standing Status:   Future     Number of Occurrences:   1     Standing Expiration Date:   4/22/2025     Order Specific Question:   Release to patient     Answer:   Routine Release [[id number]]    Urinalysis With Culture If Indicated - Urine, Clean Catch     Standing Status:   Future     Number of Occurrences:   1     Standing Expiration Date:   4/22/2025     Order Specific Question:   Release to patient     Answer:   Routine Release [[id number]]       Return in about 6 months (around 10/22/2024), or if symptoms worsen or fail to improve, for F/U HTN and HLD.

## 2024-05-07 ENCOUNTER — HOSPITAL ENCOUNTER (OUTPATIENT)
Dept: BONE DENSITY | Facility: HOSPITAL | Age: 69
Discharge: HOME | End: 2024-05-07
Admitting: NURSE PRACTITIONER
Payer: MEDICARE

## 2024-05-07 PROCEDURE — 77080 DXA BONE DENSITY AXIAL: CPT

## 2024-05-22 ENCOUNTER — TELEPHONE (OUTPATIENT)
Dept: FAMILY MEDICINE CLINIC | Facility: CLINIC | Age: 69
End: 2024-05-22

## 2024-05-22 NOTE — TELEPHONE ENCOUNTER
Caller: Sabiha Gonzalez    Relationship to patient: Self    Best call back number: [phone]    Patient is needing: OUR PATIENT IS FINE WITH DR AHSAN PENA OR DR JEROME MCKAY AS HER NEW PCP

## 2024-08-21 ENCOUNTER — TELEPHONE (OUTPATIENT)
Dept: FAMILY MEDICINE CLINIC | Facility: CLINIC | Age: 69
End: 2024-08-21
Payer: MEDICARE

## 2024-08-21 NOTE — TELEPHONE ENCOUNTER
Name: Sabiha Gonzalez    Relationship: Self    Best Callback Number: [phone] (Mobile)     HUB PROVIDED THE RELAY MESSAGE FROM THE OFFICE   PATIENT VOICED UNDERSTANDING AND HAS NO FURTHER QUESTIONS AT THIS TIME    ADDITIONAL INFORMATION:     The patient is a 63y Female complaining of

## 2024-08-21 NOTE — TELEPHONE ENCOUNTER
Message from Aparna Wren sent at 8/20/2024  4:25 PM EDT -----  Please let patient know her mammogram was negative (normal) and we will continue with annual screenings.

## 2024-08-21 NOTE — TELEPHONE ENCOUNTER
HUB TO RELAY    LVM. Please let patient know her mammogram was negative (normal) and we will continue with annual screenings.

## 2024-08-26 RX ORDER — FLECAINIDE ACETATE 50 MG/1
50 TABLET ORAL 2 TIMES DAILY
Qty: 180 TABLET | Refills: 1 | Status: SHIPPED | OUTPATIENT
Start: 2024-08-26

## 2024-11-27 RX ORDER — FLECAINIDE ACETATE 50 MG/1
50 TABLET ORAL 2 TIMES DAILY
Qty: 180 TABLET | Refills: 1 | OUTPATIENT
Start: 2024-11-27

## 2024-11-27 NOTE — TELEPHONE ENCOUNTER
Called [facility] pharmacy as script sent in 8/26/24 was for 90 day supply with 1 refill. They confirmed they have that script. Pt picked up the last refill on 11/26. Will not need refill until Feb. 2025.

## 2024-12-27 ENCOUNTER — TELEPHONE (OUTPATIENT)
Dept: CARDIOLOGY | Facility: CLINIC | Age: 69
End: 2024-12-27
Payer: MEDICARE

## 2024-12-27 DIAGNOSIS — R00.2 PALPITATIONS: ICD-10-CM

## 2024-12-27 DIAGNOSIS — I49.3 PVC (PREMATURE VENTRICULAR CONTRACTION): ICD-10-CM

## 2024-12-27 DIAGNOSIS — I49.1 PAC (PREMATURE ATRIAL CONTRACTION): ICD-10-CM

## 2024-12-27 RX ORDER — METOPROLOL SUCCINATE 50 MG/1
50 TABLET, EXTENDED RELEASE ORAL DAILY
Qty: 90 TABLET | Refills: 0 | Status: SHIPPED | OUTPATIENT
Start: 2024-12-27

## 2024-12-27 NOTE — TELEPHONE ENCOUNTER
Caller: [id number] - [facility] [address] AT [facility] [address] [facility] [phone] [facility] [phone] FX    Relationship: Pharmacy    Best call back number: [phone]    Requested Prescriptions:   Requested Prescriptions     Pending Prescriptions Disp Refills    metoprolol succinate XL (TOPROL-XL) 50 MG 24 hr tablet 90 tablet 3     Sig: Take 1 tablet by mouth Daily.        Pharmacy where request should be sent:      Last office visit with prescribing clinician: 1/23/2024   Last telemedicine visit with prescribing clinician: Visit date not found   Next office visit with prescribing clinician: 2/25/2025     Additional details provided by patient:     Does the patient have less than a 3 day supply:  [] Yes  [x] No    Would you like a call back once the refill request has been completed: [] Yes [x] No    If the office needs to give you a call back, can they leave a voicemail: [] Yes [x] No    Willi Fernandez Rep   12/27/24 11:25 EST

## 2025-02-25 ENCOUNTER — OFFICE VISIT (OUTPATIENT)
Dept: CARDIOLOGY | Facility: CLINIC | Age: 70
End: 2025-02-25
Payer: MEDICARE

## 2025-02-25 VITALS
DIASTOLIC BLOOD PRESSURE: 66 MMHG | WEIGHT: 145.6 LBS | SYSTOLIC BLOOD PRESSURE: 120 MMHG | HEART RATE: 54 BPM | BODY MASS INDEX: 23.4 KG/M2 | HEIGHT: 66 IN | OXYGEN SATURATION: 97 %

## 2025-02-25 DIAGNOSIS — I49.3 PVC'S (PREMATURE VENTRICULAR CONTRACTIONS): Primary | ICD-10-CM

## 2025-02-25 DIAGNOSIS — I34.0 NONRHEUMATIC MITRAL VALVE REGURGITATION: ICD-10-CM

## 2025-02-25 PROCEDURE — 3074F SYST BP LT 130 MM HG: CPT | Performed by: INTERNAL MEDICINE

## 2025-02-25 PROCEDURE — G2211 COMPLEX E/M VISIT ADD ON: HCPCS | Performed by: INTERNAL MEDICINE

## 2025-02-25 PROCEDURE — 3078F DIAST BP <80 MM HG: CPT | Performed by: INTERNAL MEDICINE

## 2025-02-25 PROCEDURE — 93000 ELECTROCARDIOGRAM COMPLETE: CPT | Performed by: INTERNAL MEDICINE

## 2025-02-25 PROCEDURE — 99214 OFFICE O/P EST MOD 30 MIN: CPT | Performed by: INTERNAL MEDICINE

## 2025-02-25 RX ORDER — MULTIVITAMIN WITH IRON
1 TABLET ORAL DAILY
COMMUNITY

## 2025-02-25 RX ORDER — COLLAGEN, HYDROLYSATE (BOVINE) 100 %
POWDER (GRAM) MISCELLANEOUS DAILY
COMMUNITY

## 2025-02-25 RX ORDER — ACYCLOVIR 50 MG/G
OINTMENT TOPICAL
COMMUNITY
Start: 2024-10-07

## 2025-02-25 RX ORDER — FLECAINIDE ACETATE 50 MG/1
50 TABLET ORAL 2 TIMES DAILY
Qty: 180 TABLET | Refills: 1 | Status: SHIPPED | OUTPATIENT
Start: 2025-02-25

## 2025-02-25 RX ORDER — PHENOL 1.4 %
600 AEROSOL, SPRAY (ML) MUCOUS MEMBRANE DAILY
COMMUNITY

## 2025-02-25 NOTE — PROGRESS NOTES
[facility] Cardiology  Follow Up Visit  Sabiha Gonzalez  1955    VISIT DATE:  02/25/25    PCP:   Aparna Wren PA-C  [facility] [id number]          CC:  Primary hypertension (1-Yr F/U)      Problem List:  1.  Mild valvular heart disease  2.  PVCs  3.  Prior Breast Cancer  -approx 2010  -treated with cytoxan  -Her 2 positive  -right mastectomy, no radiation     Cardiac testing:     Echo April 2022:  Calculated left ventricular EF = 60% Left ventricular systolic function is normal.  Mild aortic valve regurgitation is present  Mild mitral valve regurgitation is present.  Mild tricuspid valve regurgitation is present. Estimated right ventricular systolic pressure from tricuspid regurgitation is normal (<35 mmHg).     Holter monitor: Rare PVCs     Stress Test:  July 2023    Patient denied chest pain.    Breast attenuation artifact is present.    Expected esercise time 5:50   Actual time 5:51 normal exercise capacity.    Myocardial perfusion imaging indicates a normal myocardial perfusion study with no evidence of ischemia.    Left ventricular ejection fraction is hyperdynamic (Calculated EF > 70%).    there is no significant coronary artery calcification.    Impressions are consistent with a low risk study.      History of Present Illness:  Sabiha Gonzalez  Is a 69 y.o. female with pertinent cardiac history detailed above.  Patient has been doing well since last visit.  Has not had any palpitations or acute cardiac symptoms.  Stable EKG showing sinus bradycardia with acceptable QRS on flecainide.  We did discuss obtaining an echocardiogram to reevaluate mitral valve regurgitation and aortic valve regurgitation compared with 2022.  Blood pressure and lipids have been well-controlled.      Patient Active Problem List    Diagnosis Date Noted    Primary hypertension 03/08/2023    Right foot pain 02/14/2022    Leg fracture, left     Arthralgia of knee 01/08/2020     Polyp of urethra 2017    Breast cancer     Postmenopausal 03/15/2017    Genital herpes 03/15/2017    Primary malignant neoplasm of female breast 2016     Note Last Updated: 2024     From Automated Load;Provider: Rosalva Cooper;Status: Active         No Known Allergies    Social History     Socioeconomic History    Marital status: Single   Tobacco Use    Smoking status: Former     Current packs/day: 0.00     Average packs/day: 0.3 packs/day for 10.0 years (2.5 ttl pk-yrs)     Types: Cigarettes     Start date: 1974     Quit date: 1984     Years since quittin.8     Passive exposure: Never    Smokeless tobacco: Never   Vaping Use    Vaping status: Never Used   Substance and Sexual Activity    Alcohol use: Yes     Alcohol/week: 2.0 standard drinks of alcohol     Types: 2 Glasses of wine per week     Comment: 1-2 glass wine daily    Drug use: Never    Sexual activity: Not Currently       Family History   Problem Relation Age of Onset    Heart disease Mother     Diabetes Mother     Breast cancer Mother     Stroke Father     Heart disease Brother 55    Diabetes Brother     Atrial fibrillation Brother     Other Brother         Covid    COPD Brother     No Known Problems Maternal Grandmother     No Known Problems Maternal Grandfather     No Known Problems Paternal Grandmother     No Known Problems Paternal Grandfather     Ovarian cancer Neg Hx     Uterine cancer Neg Hx     Colon cancer Neg Hx        Current Medications:    Current Outpatient Medications:     acyclovir (ZOVIRAX) 5 % ointment, Space applications every 3 hours. (Patient taking differently: Apply 1 Application topically to the appropriate area as directed As Needed.), Disp: , Rfl:     calcium carbonate (OS-MARAH) 600 MG tablet, Take 1 tablet by mouth Daily. OTC, Disp: , Rfl:     Collagen Hydrolysate powder, Use Daily. OTC, mix with coffee in the morning, Disp: , Rfl:     Magnesium 250 MG tablet, Take 1 tablet by mouth Daily. OTC, Disp:  ", Rfl:     metoprolol succinate XL (TOPROL-XL) 50 MG 24 hr tablet, Take 1 tablet by mouth Daily., Disp: 90 tablet, Rfl: 0    Multiple Vitamin (MULTI VITAMIN) tablet, Take  by mouth., Disp: , Rfl:     flecainide (TAMBOCOR) 50 MG tablet, Take 1 tablet by mouth 2 (Two) Times a Day., Disp: 180 tablet, Rfl: 1     Review of Systems   Cardiovascular: Negative.    Respiratory: Negative.         Vitals:    02/25/25 1314   BP: 120/66   BP Location: Left arm   Patient Position: Sitting   Cuff Size: Adult   Pulse: 54   SpO2: 97%   Weight: 66 kg (145 lb 9.6 oz)   Height: 167.6 cm (66\")       Physical Exam  Constitutional:       Appearance: Normal appearance.   Neck:      Vascular: No carotid bruit.   Cardiovascular:      Rate and Rhythm: Bradycardia present.      Heart sounds: No murmur heard.  Pulmonary:      Effort: Pulmonary effort is normal.      Breath sounds: Normal breath sounds.   Musculoskeletal:      Right lower leg: No edema.      Left lower leg: No edema.   Neurological:      General: No focal deficit present.      Mental Status: She is alert.         Diagnostic Data:    ECG 12 Lead    Date/Time: 2/25/2025 1:38 PM  Performed by: Oliverio Jones MD    Authorized by: Oliverio Jones MD  Comparison: compared with previous ECG from 3/8/2024  Rhythm: sinus bradycardia  Rate: bradycardic  BPM: 54  Conduction: incomplete right bundle branch block  QRS axis: normal    Clinical impression: abnormal EKG        Lab Results   Component Value Date    TRIG 74 04/22/2024    HDL 81 (H) 04/22/2024     Lab Results   Component Value Date    GLUCOSE 75 04/22/2024    BUN 14 04/22/2024    CREATININE 0.78 04/22/2024     04/22/2024    K 4.7 04/22/2024     04/22/2024    CO2 26.0 04/22/2024     Lab Results   Component Value Date    HGBA1C 5.70 (H) 04/22/2024     Lab Results   Component Value Date    WBC 7.16 04/22/2024    HGB 12.6 04/22/2024    HCT 38.4 04/22/2024     04/22/2024       Assessment:   Diagnosis " Plan   1. PVC's (premature ventricular contractions)  ECG 12 Lead      2. Nonrheumatic mitral valve regurgitation  Adult Transthoracic Echo Complete W/ Cont if Necessary Per Protocol          Plan:    1.  Mild valvular disease  -will repeat echo to reassess valvular disease.  Last was in 2022     2.  PVCs  -Symptoms are well-controlled on Toprol-XL 50 mg and flecainide  -Stress test July 2023: No ischemia, EF greater than 70%.  No coronary artery calcification  -Stable EKG     3.  Healthcare maintenance  Total cholesterol 195,  HDL 81,    ASCVD risk is low at 4.8%.    4.  History of Breast Ca  -treated approx 2010  -Ejection fraction is preserved             ACP discussion was held with the patient during this visit. Patient does not have an advance directive, declines further assistance.      Oliverio Jones MD [facility]

## 2025-03-11 ENCOUNTER — HOSPITAL ENCOUNTER (OUTPATIENT)
Dept: CARDIOLOGY | Facility: HOSPITAL | Age: 70
Discharge: HOME | End: 2025-03-11
Admitting: INTERNAL MEDICINE
Payer: MEDICARE

## 2025-03-11 VITALS
WEIGHT: 145.5 LBS | BODY MASS INDEX: 23.38 KG/M2 | DIASTOLIC BLOOD PRESSURE: 66 MMHG | HEIGHT: 66 IN | SYSTOLIC BLOOD PRESSURE: 111 MMHG

## 2025-03-11 DIAGNOSIS — I34.0 NONRHEUMATIC MITRAL VALVE REGURGITATION: ICD-10-CM

## 2025-03-11 LAB
AORTIC DIMENSIONLESS INDEX: 0.8 (DI)
AV MEAN PRESS GRAD SYS DOP V1V2: 6 MMHG
AV VMAX SYS DOP: 163 CM/SEC
BH CV ECHO MEAS - AI P1/2T: 516.2 MSEC
BH CV ECHO MEAS - AO MAX PG: 10.6 MMHG
BH CV ECHO MEAS - AO ROOT DIAM: 3.1 CM
BH CV ECHO MEAS - AO V2 VTI: 33.4 CM
BH CV ECHO MEAS - AVA(I,D): 2.5 CM2
BH CV ECHO MEAS - EDV(CUBED): 68.9 ML
BH CV ECHO MEAS - EDV(MOD-SP2): 88.1 ML
BH CV ECHO MEAS - EDV(MOD-SP4): 85.5 ML
BH CV ECHO MEAS - EF(MOD-SP2): 66.3 %
BH CV ECHO MEAS - EF(MOD-SP4): 62.7 %
BH CV ECHO MEAS - ESV(CUBED): 12.2 ML
BH CV ECHO MEAS - ESV(MOD-SP2): 29.7 ML
BH CV ECHO MEAS - ESV(MOD-SP4): 31.9 ML
BH CV ECHO MEAS - FS: 43.9 %
BH CV ECHO MEAS - IVS/LVPW: 1.11 CM
BH CV ECHO MEAS - IVSD: 1 CM
BH CV ECHO MEAS - LA DIMENSION: 2.5 CM
BH CV ECHO MEAS - LAT PEAK E' VEL: 11.3 CM/SEC
BH CV ECHO MEAS - LV DIASTOLIC VOL/BSA (35-75): 49 CM2
BH CV ECHO MEAS - LV MASS(C)D: 123 GRAMS
BH CV ECHO MEAS - LV MAX PG: 7.1 MMHG
BH CV ECHO MEAS - LV MEAN PG: 3.5 MMHG
BH CV ECHO MEAS - LV SYSTOLIC VOL/BSA (12-30): 18.3 CM2
BH CV ECHO MEAS - LV V1 MAX: 133 CM/SEC
BH CV ECHO MEAS - LV V1 VTI: 26.6 CM
BH CV ECHO MEAS - LVIDD: 4.1 CM
BH CV ECHO MEAS - LVIDS: 2.3 CM
BH CV ECHO MEAS - LVOT AREA: 3.1 CM2
BH CV ECHO MEAS - LVOT DIAM: 2 CM
BH CV ECHO MEAS - LVPWD: 0.9 CM
BH CV ECHO MEAS - MED PEAK E' VEL: 7.7 CM/SEC
BH CV ECHO MEAS - MV A MAX VEL: 89.1 CM/SEC
BH CV ECHO MEAS - MV DEC SLOPE: 368 CM/SEC2
BH CV ECHO MEAS - MV DEC TIME: 0.24 SEC
BH CV ECHO MEAS - MV E MAX VEL: 61.3 CM/SEC
BH CV ECHO MEAS - MV E/A: 0.69
BH CV ECHO MEAS - MV MAX PG: 5.1 MMHG
BH CV ECHO MEAS - MV MEAN PG: 2 MMHG
BH CV ECHO MEAS - MV P1/2T: 86 MSEC
BH CV ECHO MEAS - MV V2 VTI: 31.4 CM
BH CV ECHO MEAS - MVA(P1/2T): 2.6 CM2
BH CV ECHO MEAS - MVA(VTI): 2.7 CM2
BH CV ECHO MEAS - PA ACC TIME: 0.13 SEC
BH CV ECHO MEAS - PA V2 MAX: 86.6 CM/SEC
BH CV ECHO MEAS - RAP SYSTOLE: 3 MMHG
BH CV ECHO MEAS - RVSP: 21 MMHG
BH CV ECHO MEAS - SV(LVOT): 83.6 ML
BH CV ECHO MEAS - SV(MOD-SP2): 58.4 ML
BH CV ECHO MEAS - SV(MOD-SP4): 53.6 ML
BH CV ECHO MEAS - SVI(LVOT): 47.9 ML/M2
BH CV ECHO MEAS - SVI(MOD-SP2): 33.5 ML/M2
BH CV ECHO MEAS - SVI(MOD-SP4): 30.7 ML/M2
BH CV ECHO MEAS - TAPSE (>1.6): 1.95 CM
BH CV ECHO MEAS - TR MAX PG: 18 MMHG
BH CV ECHO MEAS - TR MAX VEL: 205.5 CM/SEC
BH CV ECHO MEASUREMENTS AVERAGE E/E' RATIO: 6.45
BH CV XLRA - RV BASE: 3.1 CM
BH CV XLRA - RV LENGTH: 7.1 CM
BH CV XLRA - RV MID: 2.3 CM
BH CV XLRA - TDI S': 12.5 CM/SEC
LEFT ATRIUM VOLUME INDEX: 24.4 ML/M2
LV EF BIPLANE MOD: 65 %

## 2025-03-11 PROCEDURE — 93306 TTE W/DOPPLER COMPLETE: CPT

## 2025-03-25 DIAGNOSIS — R00.2 PALPITATIONS: ICD-10-CM

## 2025-03-25 DIAGNOSIS — I49.3 PVC (PREMATURE VENTRICULAR CONTRACTION): ICD-10-CM

## 2025-03-25 DIAGNOSIS — I49.1 PAC (PREMATURE ATRIAL CONTRACTION): ICD-10-CM

## 2025-03-25 RX ORDER — METOPROLOL SUCCINATE 50 MG/1
50 TABLET, EXTENDED RELEASE ORAL DAILY
Qty: 90 TABLET | Refills: 1 | Status: SHIPPED | OUTPATIENT
Start: 2025-03-25

## 2025-05-14 ENCOUNTER — LAB (OUTPATIENT)
Dept: LAB | Facility: HOSPITAL | Age: 70
End: 2025-05-14
Payer: MEDICARE

## 2025-05-14 ENCOUNTER — OFFICE VISIT (OUTPATIENT)
Dept: FAMILY MEDICINE CLINIC | Facility: CLINIC | Age: 70
End: 2025-05-14
Payer: MEDICARE

## 2025-05-14 ENCOUNTER — TELEPHONE (OUTPATIENT)
Dept: CARDIOLOGY | Facility: CLINIC | Age: 70
End: 2025-05-14
Payer: MEDICARE

## 2025-05-14 VITALS
HEART RATE: 63 BPM | WEIGHT: 145.8 LBS | OXYGEN SATURATION: 98 % | TEMPERATURE: 98.6 F | BODY MASS INDEX: 23.43 KG/M2 | DIASTOLIC BLOOD PRESSURE: 72 MMHG | HEIGHT: 66 IN | SYSTOLIC BLOOD PRESSURE: 128 MMHG

## 2025-05-14 DIAGNOSIS — R73.03 PREDIABETES: ICD-10-CM

## 2025-05-14 DIAGNOSIS — R00.2 PALPITATIONS: Primary | ICD-10-CM

## 2025-05-14 DIAGNOSIS — E78.5 HYPERLIPIDEMIA, UNSPECIFIED HYPERLIPIDEMIA TYPE: ICD-10-CM

## 2025-05-14 DIAGNOSIS — I49.3 PVC'S (PREMATURE VENTRICULAR CONTRACTIONS): ICD-10-CM

## 2025-05-14 DIAGNOSIS — G25.81 RESTLESS LEGS SYNDROME: ICD-10-CM

## 2025-05-14 DIAGNOSIS — R79.9 ABNORMAL FINDING OF BLOOD CHEMISTRY, UNSPECIFIED: ICD-10-CM

## 2025-05-14 DIAGNOSIS — I10 PRIMARY HYPERTENSION: Chronic | ICD-10-CM

## 2025-05-14 DIAGNOSIS — I10 PRIMARY HYPERTENSION: ICD-10-CM

## 2025-05-14 DIAGNOSIS — A60.00 GENITAL HERPES SIMPLEX, UNSPECIFIED SITE: Chronic | ICD-10-CM

## 2025-05-14 DIAGNOSIS — Z00.00 MEDICARE ANNUAL WELLNESS VISIT, SUBSEQUENT: Primary | ICD-10-CM

## 2025-05-14 DIAGNOSIS — L01.00 IMPETIGO: ICD-10-CM

## 2025-05-14 DIAGNOSIS — I34.0 NONRHEUMATIC MITRAL VALVE REGURGITATION: ICD-10-CM

## 2025-05-14 DIAGNOSIS — M17.11 PRIMARY OSTEOARTHRITIS OF RIGHT KNEE: ICD-10-CM

## 2025-05-14 DIAGNOSIS — Z85.3 HISTORY OF BREAST CANCER: ICD-10-CM

## 2025-05-14 PROBLEM — M79.671 RIGHT FOOT PAIN: Status: RESOLVED | Noted: 2022-02-14 | Resolved: 2025-05-14

## 2025-05-14 LAB
ALBUMIN SERPL-MCNC: 4.3 G/DL (ref 3.5–5.2)
ALBUMIN/GLOB SERPL: 1.6 G/DL
ALP SERPL-CCNC: 58 U/L (ref 39–117)
ALT SERPL W P-5'-P-CCNC: 16 U/L (ref 1–33)
ANION GAP SERPL CALCULATED.3IONS-SCNC: 12.3 MMOL/L (ref 5–15)
AST SERPL-CCNC: 24 U/L (ref 1–32)
BASOPHILS # BLD AUTO: 0.02 10*3/MM3 (ref 0–0.2)
BASOPHILS NFR BLD AUTO: 0.3 % (ref 0–1.5)
BILIRUB SERPL-MCNC: 0.4 MG/DL (ref 0–1.2)
BUN SERPL-MCNC: 15 MG/DL (ref 8–23)
BUN/CREAT SERPL: 17 (ref 7–25)
CALCIUM SPEC-SCNC: 9.5 MG/DL (ref 8.6–10.5)
CHLORIDE SERPL-SCNC: 100 MMOL/L (ref 98–107)
CHOLEST SERPL-MCNC: 199 MG/DL (ref 0–200)
CO2 SERPL-SCNC: 23.7 MMOL/L (ref 22–29)
CREAT SERPL-MCNC: 0.88 MG/DL (ref 0.57–1)
DEPRECATED RDW RBC AUTO: 38.2 FL (ref 37–54)
EGFRCR SERPLBLD CKD-EPI 2021: 70.4 ML/MIN/1.73
EOSINOPHIL # BLD AUTO: 0.13 10*3/MM3 (ref 0–0.4)
EOSINOPHIL NFR BLD AUTO: 1.7 % (ref 0.3–6.2)
ERYTHROCYTE [DISTWIDTH] IN BLOOD BY AUTOMATED COUNT: 12.7 % (ref 12.3–15.4)
FERRITIN SERPL-MCNC: 94.6 NG/ML (ref 13–150)
GLOBULIN UR ELPH-MCNC: 2.7 GM/DL
GLUCOSE SERPL-MCNC: 92 MG/DL (ref 65–99)
HBA1C MFR BLD: 5.5 % (ref 4.8–5.6)
HCT VFR BLD AUTO: 38.8 % (ref 34–46.6)
HDLC SERPL-MCNC: 88 MG/DL (ref 40–60)
HGB BLD-MCNC: 12.6 G/DL (ref 12–15.9)
IMM GRANULOCYTES # BLD AUTO: 0.03 10*3/MM3 (ref 0–0.05)
IMM GRANULOCYTES NFR BLD AUTO: 0.4 % (ref 0–0.5)
LDLC SERPL CALC-MCNC: 98 MG/DL (ref 0–100)
LDLC/HDLC SERPL: 1.1 {RATIO}
LYMPHOCYTES # BLD AUTO: 2.57 10*3/MM3 (ref 0.7–3.1)
LYMPHOCYTES NFR BLD AUTO: 33.4 % (ref 19.6–45.3)
MAGNESIUM SERPL-MCNC: 2.2 MG/DL (ref 1.6–2.4)
MCH RBC QN AUTO: 27.2 PG (ref 26.6–33)
MCHC RBC AUTO-ENTMCNC: 32.5 G/DL (ref 31.5–35.7)
MCV RBC AUTO: 83.8 FL (ref 79–97)
MONOCYTES # BLD AUTO: 0.74 10*3/MM3 (ref 0.1–0.9)
MONOCYTES NFR BLD AUTO: 9.6 % (ref 5–12)
NEUTROPHILS NFR BLD AUTO: 4.21 10*3/MM3 (ref 1.7–7)
NEUTROPHILS NFR BLD AUTO: 54.6 % (ref 42.7–76)
NRBC BLD AUTO-RTO: 0 /100 WBC (ref 0–0.2)
PLATELET # BLD AUTO: 266 10*3/MM3 (ref 140–450)
PMV BLD AUTO: 9.8 FL (ref 6–12)
POTASSIUM SERPL-SCNC: 4.1 MMOL/L (ref 3.5–5.2)
PROT SERPL-MCNC: 7 G/DL (ref 6–8.5)
RBC # BLD AUTO: 4.63 10*6/MM3 (ref 3.77–5.28)
SODIUM SERPL-SCNC: 136 MMOL/L (ref 136–145)
TRIGL SERPL-MCNC: 72 MG/DL (ref 0–150)
VLDLC SERPL-MCNC: 13 MG/DL (ref 5–40)
WBC NRBC COR # BLD AUTO: 7.7 10*3/MM3 (ref 3.4–10.8)

## 2025-05-14 PROCEDURE — 83036 HEMOGLOBIN GLYCOSYLATED A1C: CPT

## 2025-05-14 PROCEDURE — 82728 ASSAY OF FERRITIN: CPT

## 2025-05-14 PROCEDURE — 80053 COMPREHEN METABOLIC PANEL: CPT

## 2025-05-14 PROCEDURE — 83735 ASSAY OF MAGNESIUM: CPT

## 2025-05-14 PROCEDURE — 80061 LIPID PANEL: CPT

## 2025-05-14 PROCEDURE — 85025 COMPLETE CBC W/AUTO DIFF WBC: CPT

## 2025-05-14 PROCEDURE — 36415 COLL VENOUS BLD VENIPUNCTURE: CPT

## 2025-05-14 RX ORDER — CEPHALEXIN 500 MG/1
500 CAPSULE ORAL 2 TIMES DAILY
Qty: 10 CAPSULE | Refills: 0 | Status: SHIPPED | OUTPATIENT
Start: 2025-05-14 | End: 2025-05-19

## 2025-05-14 NOTE — ASSESSMENT & PLAN NOTE
EKG today reveals occasional PVCs.  Patient has been experiencing palpitations the last 3 nights.  States she will contact her cardiologist, Dr. Jones, to discuss further

## 2025-05-14 NOTE — PROGRESS NOTES
The ABCs of the Annual Wellness Visit  Subsequent Medicare Wellness Visit    Subjective    Sabiha Gonzalez is a 69 y.o. female who presents for a Subsequent Medicare Wellness Visit.    The following portions of the patient's history were reviewed and   updated as appropriate: allergies, current medications, past family history, past medical history, past social history, past surgical history, and problem list.    Compared to one year ago, the patient feels her physical   health is worse. Decreased strength.     Compared to one year ago, the patient feels her mental   health is the same.    Recent Hospitalizations:  She was not admitted to the hospital during the last year.       Current Medical Providers:  Patient Care Team:  Aparna Wren PA-C as PCP - General (Family Medicine)  Kulwinder Espinosa MD as Consulting Physician (Obstetrics and Gynecology)  Oliverio Jones MD as Consulting Physician (Cardiology)    Outpatient Medications Prior to Visit   Medication Sig Dispense Refill    acyclovir (ZOVIRAX) 5 % ointment Space applications every 3 hours. (Patient taking differently: Apply 1 Application topically to the appropriate area as directed As Needed.)      calcium carbonate (OS-MARAH) 600 MG tablet Take 1 tablet by mouth Daily. OTC      Collagen Hydrolysate powder Use Daily. OTC, mix with coffee in the morning      flecainide (TAMBOCOR) 50 MG tablet Take 1 tablet by mouth 2 (Two) Times a Day. 180 tablet 1    Magnesium 250 MG tablet Take 1 tablet by mouth Daily. OTC      metoprolol succinate XL (TOPROL-XL) 50 MG 24 hr tablet Take 1 tablet by mouth Daily. 90 tablet 1    Multiple Vitamin (MULTI VITAMIN) tablet Take  by mouth.       No facility-administered medications prior to visit.       No opioid medication identified on active medication list. I have reviewed chart for other potential  high risk medication/s and harmful drug interactions in the elderly.        Aspirin is not on active medication  "list.  Aspirin use is not indicated based on review of current medical condition/s. Risk of harm outweighs potential benefits.  .    Patient Active Problem List   Diagnosis    Postmenopausal    Genital herpes    Polyp of urethra    Breast cancer    Leg fracture, left    Arthralgia of knee    Primary hypertension    Primary malignant neoplasm of female breast    PVC's (premature ventricular contractions)    Nonrheumatic mitral valve regurgitation    Medicare annual wellness visit, subsequent    Primary osteoarthritis of right knee    Restless legs syndrome    Prediabetes    History of breast cancer     Advance Care Planning   Advance Care Planning     Advance Directive is not on file.  ACP discussion was held with the patient during this visit. Patient has an advance directive (not in EMR), copy requested.     Objective    Vitals:    25 0937   BP: 128/72   Pulse: 63   Temp: 98.6 °F (37 °C)   TempSrc: Infrared   SpO2: 98%   Weight: 66.1 kg (145 lb 12.8 oz)   Height: 167.6 cm (65.98\")   PainSc: 0-No pain     Estimated body mass index is 23.54 kg/m² as calculated from the following:    Height as of this encounter: 167.6 cm (65.98\").    Weight as of this encounter: 66.1 kg (145 lb 12.8 oz).    BMI is within normal parameters. No other follow-up for BMI required.      Does the patient have evidence of cognitive impairment? No          HEALTH RISK ASSESSMENT    Smoking Status:  Social History     Tobacco Use   Smoking Status Former    Current packs/day: 0.00    Average packs/day: 0.3 packs/day for 15.0 years (5.0 ttl pk-yrs)    Types: Cigarettes    Start date: 1974    Quit date: 1984    Years since quittin.0    Passive exposure: Never   Smokeless Tobacco Never     Alcohol Consumption:  Social History     Substance and Sexual Activity   Alcohol Use Yes    Alcohol/week: 2.0 standard drinks of alcohol    Types: 2 Glasses of wine per week    Comment: 1-2 glass wine daily     Fall Risk Screen:    STEADI Fall " Risk Assessment was completed, and patient is at LOW risk for falls.Assessment completed on:2025    Depression Screenin/14/2025     9:35 AM   PHQ-2/PHQ-9 Depression Screening   Little interest or pleasure in doing things Not at all   Feeling down, depressed, or hopeless Not at all       Health Habits and Functional and Cognitive Screenin/14/2025     9:35 AM   Functional & Cognitive Status   Do you have difficulty preparing food and eating? No   Do you have difficulty bathing yourself, getting dressed or grooming yourself? No   Do you have difficulty using the toilet? No   Do you have difficulty moving around from place to place? No   Do you have trouble with steps or getting out of a bed or a chair? No   Current Diet Well Balanced Diet   Dental Exam Up to date   Eye Exam Up to date   Exercise (times per week) 7 times per week   Current Exercises Include Gardening;Hiking;House Cleaning;Light Weights;Yard Work;Walking   Do you need help using the phone?  No   Are you deaf or do you have serious difficulty hearing?  No   Do you need help to go to places out of walking distance? No   Do you need help shopping? No   Do you need help preparing meals?  No   Do you need help with housework?  No   Do you need help with laundry? No   Do you need help taking your medications? No   Do you need help managing money? No   Do you ever drive or ride in a car without wearing a seat belt? No   Have you felt unusual stress, anger or loneliness in the last month? No   Who do you live with? Alone   If you need help, do you have trouble finding someone available to you? No   Have you been bothered in the last four weeks by sexual problems? No   Do you have difficulty concentrating, remembering or making decisions? No       Age-appropriate Screening Schedule:  Refer to the list below for future screening recommendations based on patient's age, sex and/or medical conditions. Orders for these recommended tests are  listed in the plan section. The patient has been provided with a written plan.    Health Maintenance   Topic Date Due    ZOSTER VACCINE (2 of 2) 12/31/2014    PAP SMEAR  10/19/2023    LIPID PANEL  04/22/2025    INFLUENZA VACCINE  07/01/2025    DXA SCAN  05/07/2026    ANNUAL WELLNESS VISIT  05/14/2026    MAMMOGRAM  08/14/2026    COLORECTAL CANCER SCREENING  06/14/2027    TDAP/TD VACCINES (3 - Td or Tdap) 06/11/2032    HEPATITIS C SCREENING  Completed    COVID-19 Vaccine  Completed    Pneumococcal Vaccine 50+  Completed                  CMS Preventative Services Quick Reference  Risk Factors Identified During Encounter  None Identified  The above risks/problems have been discussed with the patient.  Pertinent information has been shared with the patient in the After Visit Summary.  An After Visit Summary and PPPS were made available to the patient.    Follow Up:   Next Medicare Wellness visit to be scheduled in 1 year.       Additional E&M Note during same encounter follows:  Patient has multiple medical problems which are significant and separately identifiable that require additional work above and beyond the Medicare Wellness Visit.      Chief Complaint  Medicare Wellness-subsequent    Subjective        HPI  Sabiha Gonzalez is also being seen today to establish care with new provider.    Patient reports a longstanding history of PVCs which is typically well-controlled with flecainide.  She is followed closely by her cardiologist Dr. Jones.  States for the last 3 nights she has been experiencing palpitations, particularly if she is lying on her back or left side.  She states she took a home rhythm strip which she has forwarded to her cardiologist which revealed a few possible PVCs.  EKG was obtained in office today which was consistent with finding of occasional PVCs.  She does not report any associated chest pain, shortness of breath, orthopnea lightheadedness/dizziness.    Patient also reports a  "longstanding history of restless legs.  She currently uses an over-the-counter product from [employer] which is an herbal supplement, which she states helps and is also taking a magnesium supplement, though she is not certain what type.  She states she experiences restless legs approximately 2-3 nights per week.    Patient also reports a history of genital herpes which is well-controlled with topical acyclovir as needed.  She states over the last several weeks she has developed a recurrent breakout of what she suspects to be impetigo.  She states the lesion started out as vesicular, then developed a yellowish crust and are slightly tender.  She states they are different than her typical herpes breakouts.  She has been using topical mupirocin which is helpful, though the lesions continue to recur.  She states she does not have a current breakout at this time.  She sees Derm annually and has a follow-up later this year.     Patient also has a history of osteoarthritis in her right knee.  She has received steroid injections which have lost their efficacy and also received a gel injection, which she feels worsened her symptoms.  She is currently followed by Dr. Roper at [facility] orthopedics and states she is planning to have a possible knee replacement later this year.  That said, she is interested in obtaining a second opinion.         Objective   Vital Signs:  /72   Pulse 63   Temp 98.6 °F (37 °C) (Infrared)   Ht 167.6 cm (65.98\")   Wt 66.1 kg (145 lb 12.8 oz)   SpO2 98%   BMI 23.54 kg/m²     Physical Exam  Constitutional:       General: She is not in acute distress.     Appearance: She is not ill-appearing.   HENT:      Head: Normocephalic.      Right Ear: Tympanic membrane and ear canal normal.      Left Ear: Tympanic membrane and ear canal normal.      Nose: Nose normal.      Mouth/Throat:      Mouth: Mucous membranes are moist.      Pharynx: No oropharyngeal exudate or posterior oropharyngeal " erythema.   Eyes:      Pupils: Pupils are equal, round, and reactive to light.   Neck:      Vascular: No carotid bruit.   Cardiovascular:      Rate and Rhythm: Normal rate. Rhythm irregular.      Heart sounds: No murmur heard.     Comments: PVCs noted  Pulmonary:      Effort: Pulmonary effort is normal. No respiratory distress.      Breath sounds: No wheezing, rhonchi or rales.   Abdominal:      General: Abdomen is flat. There is no distension.      Tenderness: There is no abdominal tenderness.   Musculoskeletal:         General: Normal range of motion.   Skin:     General: Skin is warm.   Neurological:      General: No focal deficit present.      Mental Status: She is alert.   Psychiatric:         Mood and Affect: Mood normal.            ECG 12 Lead    Date/Time: 5/14/2025 3:33 PM  Performed by: Aparna Wren PA-C    Authorized by: Aparna Wren PA-C  Comparison: compared with previous ECG   Comparison to previous ECG: Incomplete RBBB no longer appreciated, PVCs noted  Rhythm: sinus rhythm  Ectopy: infrequent PVCs  Rate: normal  BPM: 60  Conduction: conduction normal  ST Segments: ST segments normal  T Waves: T waves normal  QRS axis: normal  Other: no other findings              Assessment and Plan   Diagnoses and all orders for this visit:    1. Medicare annual wellness visit, subsequent (Primary)  Assessment & Plan:  The patient is here for health maintenance visit.  Currently, the patient consumes a healthy diet and has an adequate exercise regimen.  Screening lab work is ordered.  Immunizations were reviewed today.  Advice and education was given regarding nutrition, aerobic exercise, routine dental evaluations, routine eye exams, reproductive health, cardiovascular risk reduction, sunscreen use, self skin examination (annual dermatology evaluations) and seatbelt use (general overall safety).  Further recommendations will be given if needed after lab evaluation.  Annual wellness evaluation is  recommended.      2. Primary hypertension  Assessment & Plan:  Hypertension is stable and controlled  Continue current treatment regimen.  Blood pressure will be reassessed in 6 months.    Orders:  -     Comprehensive Metabolic Panel; Future  -     Lipid Panel; Future  -     CBC & Differential; Future    3. Primary osteoarthritis of right knee  Assessment & Plan:  Currently followed by Dr. Roper, though is interested in second opinion.  Will send referral to Ortho today    Orders:  -     Ambulatory Referral to Orthopedic Surgery    4. Genital herpes simplex, unspecified site  Assessment & Plan:  Well-controlled with topical acyclovir as needed      5. Nonrheumatic mitral valve regurgitation  Assessment & Plan:  Followed closely by Dr. Jones      6. PVC's (premature ventricular contractions)  Assessment & Plan:  EKG today reveals occasional PVCs.  Patient has been experiencing palpitations the last 3 nights.  States she will contact her cardiologist, Dr. Jones, to discuss further    Orders:  -     Comprehensive Metabolic Panel; Future  -     Lipid Panel; Future  -     CBC & Differential; Future  -     Magnesium; Future  -     ECG 12 Lead    7. Hyperlipidemia, unspecified hyperlipidemia type  -     Comprehensive Metabolic Panel; Future  -     Lipid Panel; Future  -     CBC & Differential; Future    8. Restless legs syndrome  Assessment & Plan:  Will screen for possible decreased iron stores today  Continue over-the-counter herbal supplement and recommend magnesium glycinate if she is not already taking this.  If symptoms persist or worsen, may benefit from trial of ropinirole    Orders:  -     Comprehensive Metabolic Panel; Future  -     Magnesium; Future  -     Ferritin; Future    9. Abnormal finding of blood chemistry, unspecified  -     Ferritin; Future    10. Prediabetes  Assessment & Plan:  Recent A1c 5.7.  Repeat labs today  Continue regular exercise and healthy diet  Follow-up in 6 months or sooner  if needed    Orders:  -     Comprehensive Metabolic Panel; Future  -     Lipid Panel; Future  -     CBC & Differential; Future  -     Hemoglobin A1c; Future    11. History of breast cancer  Assessment & Plan:  Followed closely by oncology annually      12. Impetigo  -     cephalexin (KEFLEX) 500 MG capsule; Take 1 capsule by mouth 2 (Two) Times a Day for 5 days.  Dispense: 10 capsule; Refill: 0           Follow Up   Return in about 6 months (around 11/14/2025) for Fasting Labs.  Patient was given instructions and counseling regarding her condition or for health maintenance advice. Please see specific information pulled into the AVS if appropriate.

## 2025-05-14 NOTE — ASSESSMENT & PLAN NOTE
Will screen for possible decreased iron stores today  Continue over-the-counter herbal supplement and recommend magnesium glycinate if she is not already taking this.  If symptoms persist or worsen, may benefit from trial of ropinirole

## 2025-05-14 NOTE — ASSESSMENT & PLAN NOTE
Currently followed by Dr. Roper, though is interested in second opinion.  Will send referral to Ortho today

## 2025-05-14 NOTE — TELEPHONE ENCOUNTER
Called patient and informed her of recommendations per NSK. She is agreeable and verbalizes understanding. States she will call with any change in symptoms.

## 2025-05-14 NOTE — ASSESSMENT & PLAN NOTE
Recent A1c 5.7.  Repeat labs today  Continue regular exercise and healthy diet  Follow-up in 6 months or sooner if needed

## 2025-05-22 ENCOUNTER — OFFICE VISIT (OUTPATIENT)
Dept: ORTHOPEDIC SURGERY | Facility: CLINIC | Age: 70
End: 2025-05-22
Payer: MEDICARE

## 2025-05-22 VITALS
SYSTOLIC BLOOD PRESSURE: 120 MMHG | DIASTOLIC BLOOD PRESSURE: 80 MMHG | BODY MASS INDEX: 23.42 KG/M2 | HEIGHT: 66 IN | WEIGHT: 145.72 LBS

## 2025-05-22 DIAGNOSIS — M25.561 RIGHT KNEE PAIN, UNSPECIFIED CHRONICITY: Primary | ICD-10-CM

## 2025-05-22 DIAGNOSIS — M17.11 PRIMARY OSTEOARTHRITIS OF RIGHT KNEE: ICD-10-CM

## 2025-05-22 RX ADMIN — LIDOCAINE HYDROCHLORIDE 4 ML: 10 INJECTION, SOLUTION EPIDURAL; INFILTRATION; INTRACAUDAL; PERINEURAL at 13:47

## 2025-05-22 RX ADMIN — TRIAMCINOLONE ACETONIDE 40 MG: 40 INJECTION, SUSPENSION INTRA-ARTICULAR; INTRAMUSCULAR at 13:47

## 2025-05-22 NOTE — PROGRESS NOTES
[facility]    Subjective     Chief Complaint   Patient presents with    Right Knee - Pain        HPI  Sabiha Gonzalez is a 69 y.o. female.  New patient presents for evaluation of right knee pain.  Symptoms/pain have been ongoing for 5 years.  ZACH: No history of injury or trauma.  Previously seen at [facility] and provided a corticosteroid injection at the end of February 2025 and 6 weeks ago completed viscosupplementation series.  States Visco series made right knee pain worse.Additionally reports history of knee arthroscopy 2011.  Patient is here today to discuss treatment options.    Pain scale: 6/10.  Severity of the pain moderate.  Quality of the pain aching, shooting.  Associated symptoms pain, giving away/buckling especially with twisting.  Activity related to pain walking, standing, stairs, rising from a seated position.  Pain eased by resting, elevating.  No reported numbness or tingling.  Prior treatments bracing, anti-inflammatories, injections.    No reported mechanical symptoms, such as locking or catching.    Denies fever, chills, night sweats or other constitutional symptoms.    Past Medical History:   Diagnosis Date    Breast cancer     Breast cancer     12 years ago R breast    Contact dermatitis     Genital herpes     History of medical problems 2020    Arrhythmia    Leg fracture, left     Osteopenia     Right foot pain 02/14/2022    Urethral polyp       Past Surgical History:   Procedure Laterality Date    BREAST SURGERY  2011    Mastectomy  right    KNEE SURGERY Right     LAPAROSCOPIC TUBAL LIGATION      MASTECTOMY      R breast    TONSILLECTOMY      TUBAL ABDOMINAL LIGATION  1988      Family History   Problem Relation Age of Onset    Heart disease Mother     Diabetes Mother     Breast cancer Mother     Stroke Father     Heart disease Brother 55    Diabetes Brother     Atrial fibrillation Brother     Other Brother         Covid    COPD Brother     No Known Problems  Maternal Grandmother     No Known Problems Maternal Grandfather     No Known Problems Paternal Grandmother     No Known Problems Paternal Grandfather     Ovarian cancer Neg Hx     Uterine cancer Neg Hx     Colon cancer Neg Hx      Social History     Socioeconomic History    Marital status: Single   Tobacco Use    Smoking status: Former     Current packs/day: 0.00     Average packs/day: 0.3 packs/day for 15.0 years (5.0 ttl pk-yrs)     Types: Cigarettes     Start date: 1974     Quit date: 1984     Years since quittin.0     Passive exposure: Never    Smokeless tobacco: Never   Vaping Use    Vaping status: Never Used   Substance and Sexual Activity    Alcohol use: Yes     Alcohol/week: 2.0 standard drinks of alcohol     Types: 2 Glasses of wine per week     Comment: 1-2 glass wine daily    Drug use: Never    Sexual activity: Not Currently      Current Outpatient Medications on File Prior to Visit   Medication Sig Dispense Refill    acyclovir (ZOVIRAX) 5 % ointment Space applications every 3 hours. (Patient taking differently: Apply 1 Application topically to the appropriate area as directed As Needed.)      calcium carbonate (OS-MARAH) 600 MG tablet Take 1 tablet by mouth Daily. OTC      Collagen Hydrolysate powder Use Daily. OTC, mix with coffee in the morning      flecainide (TAMBOCOR) 50 MG tablet Take 1 tablet by mouth 2 (Two) Times a Day. 180 tablet 1    Magnesium 250 MG tablet Take 1 tablet by mouth Daily. OTC      metoprolol succinate XL (TOPROL-XL) 50 MG 24 hr tablet Take 1 tablet by mouth Daily. 90 tablet 1    Multiple Vitamin (MULTI VITAMIN) tablet Take  by mouth.       No current facility-administered medications on file prior to visit.      No Known Allergies     The following portions of the patient's history were reviewed and updated as appropriate: allergies, current medications, past family history, past medical history, past social history, past surgical history, and problem list.    Review  "of Systems   Constitutional: Negative.    HENT: Negative.     Eyes: Negative.    Respiratory: Negative.     Cardiovascular: Negative.    Gastrointestinal: Negative.    Endocrine: Negative.    Genitourinary: Negative.    Musculoskeletal:  Positive for arthralgias.   Skin: Negative.    Allergic/Immunologic: Negative.    Neurological: Negative.    Hematological: Negative.    Psychiatric/Behavioral: Negative.          Objective      Physical Exam  /80   Ht 167.6 cm (65.98\")   Wt 66.1 kg (145 lb 11.6 oz)   BMI 23.53 kg/m²     Body mass index is 23.53 kg/m².    GENERAL APPEARANCE: awake, alert & oriented x 3, in no acute distress and well developed, well nourished  PSYCH: normal mood and affect  LUNGS:  breathing nonlabored, no wheezing  EYES: sclera anicteric, pupils equal  CARDIOVASCULAR: palpable pulses. Capillary refill less than 2 seconds  INTEGUMENTARY: skin intact, no clubbing, cyanosis  NEUROLOGIC:  Normal Sensation         Ortho Exam  Right knee  Alignment: Genu varum  Skin: Intact without any erythema, warmth.  Trace effusion.  Motion: 0-120° with crepitus.  Tenderness: Diffuse/global tenderness noted throughout the knee but more pronounced along medial joint line.  Instability: Lachman negative.  Stable to varus and valgus stress at terminal extension and 30 degrees. MCL--retensioning of the MCL is appreciated with valgus stress at 30 degrees consistent with medial compartment degeneration   Meniscus: Isrrael's positive medial joint line without catch or click.    Patella: Compression/grind positive.  Straight leg raise: Intact.  Motor: Grossly intact Q/HS/TA/GS/EHL/P  Sensory: Grossly intact DP/SP/S/S/T nerve distributions.       Imaging/Studies  Ordered right knee plain films.  Imaging read/interpreted by Dr. Devries.    Imaging Results (Last 7 Days)       Procedure Component Value Units Date/Time    XR Knee 4+ View Right [[id number]] Resulted: 05/22/25 1420     Updated: 05/22/25 1420    Narrative:   "    Indication: Status post right total knee arthroplasty    Comparison: No prior xrays are available for comparison      Impression:           Right Knee: moderate to severe tricompartmental arthritis with genu varum   alignment, periarticular osteophytes visualized in all compartments.  No   acute bony injury or fracture.              Assessment/Plan        ICD-10-CM ICD-9-CM   1. Right knee pain, unspecified chronicity  M25.561 719.46   2. Primary osteoarthritis of right knee  M17.11 715.16       Orders Placed This Encounter   Procedures    - Large Joint Arthrocentesis: R knee    XR Knee 4+ View Right        -Right knee pain due to osteoarthritis especially to medial compartment.  -Reviewed imaging with the patient.  -Treatment options were discussed with the patient.  At some point she will require TKA if the pain gets bad enough.    -Provided her with the names of surgeons here capable of performing TKA.  -At this time she wishes to proceed with corticosteroid injection.  Understands that she will need to wait a minimum of 3 months before proceeding with TKA due to the injection given today.  -Offered and accepted corticosteroid injection.  Injection was given today.  -Recommend OTC NSAIDS/pain medication as needed.  -Follow up as needed.  -Questions and concerns answered.    After discussing the risks, benefits, indications of injection, the patient gave consent to proceed.  Her right knee was confirmed as the correct joint to be injected with a timeout.  It was then prepped using Hibiclens and injected with a mixture of 4 cc of 1% plain lidocaine and 1 cc of Kenalog (40 mg per mL), without any resistance through the anterior lateral approach, patient in seated position.  Area was cleaned, hemostasis was achieved and a Band-Aid was applied over the injection site.  The patient tolerated procedure well.  I instructed the patient on signs and symptoms of infection.  They should report to the emergency department  or return to clinic if any of these develop, for further evaluation and treatment.  Recommended modifying activity for the next 48 hours to include rest, ice, elevation and oral pain medication as needed.        Medical Decision Making  Management Options : over-the-counter medicine and prescription/IM medicine  Data/Risk: radiology tests      Kari Terry PA-C  05/22/25  21:21 EDT               EMR Dragon/Transcription disclaimer:  Much of this encounter note is an electronic transcription of spoken language to printed text. Electronic transcription of spoken language may permit erroneous, or at times, nonsensical words or phrases to be inadvertently transcribed. Although I have reviewed the note for such errors, some may still exist.

## 2025-05-22 NOTE — PROGRESS NOTES
Procedure   - Large Joint Arthrocentesis: R knee on 5/22/2025 1:47 PM  Indications: pain  Details: 21 G needle, anterolateral approach  Medications: 40 mg triamcinolone acetonide 40 MG/ML; 4 mL lidocaine PF 1% 1 %  Outcome: tolerated well, no immediate complications  Procedure, treatment alternatives, risks and benefits explained, specific risks discussed. Consent was given by the patient. Immediately prior to procedure a time out was called to verify the correct patient, procedure, equipment, support staff and site/side marked as required. Patient was prepped and draped in the usual sterile fashion.

## 2025-05-23 RX ORDER — FLECAINIDE ACETATE 50 MG/1
50 TABLET ORAL 2 TIMES DAILY
Qty: 180 TABLET | Refills: 1 | Status: SHIPPED | OUTPATIENT
Start: 2025-05-23

## 2025-05-27 RX ORDER — TRIAMCINOLONE ACETONIDE 40 MG/ML
40 INJECTION, SUSPENSION INTRA-ARTICULAR; INTRAMUSCULAR
Status: COMPLETED | OUTPATIENT
Start: 2025-05-22 | End: 2025-05-22

## 2025-05-27 RX ORDER — LIDOCAINE HYDROCHLORIDE 10 MG/ML
4 INJECTION, SOLUTION EPIDURAL; INFILTRATION; INTRACAUDAL; PERINEURAL
Status: COMPLETED | OUTPATIENT
Start: 2025-05-22 | End: 2025-05-22

## 2025-05-28 ENCOUNTER — RESULTS FOLLOW-UP (OUTPATIENT)
Dept: CARDIOLOGY | Facility: CLINIC | Age: 70
End: 2025-05-28

## 2025-05-28 DIAGNOSIS — I49.3 PVC'S (PREMATURE VENTRICULAR CONTRACTIONS): Primary | ICD-10-CM

## 2025-06-03 ENCOUNTER — PREP FOR SURGERY (OUTPATIENT)
Dept: OTHER | Facility: HOSPITAL | Age: 70
End: 2025-06-03
Payer: MEDICARE

## 2025-06-03 DIAGNOSIS — I49.3 PVC'S (PREMATURE VENTRICULAR CONTRACTIONS): Primary | ICD-10-CM

## 2025-06-03 RX ORDER — SODIUM CHLORIDE 0.9 % (FLUSH) 0.9 %
10 SYRINGE (ML) INJECTION EVERY 12 HOURS SCHEDULED
OUTPATIENT
Start: 2025-06-03

## 2025-06-03 RX ORDER — SODIUM CHLORIDE 0.9 % (FLUSH) 0.9 %
10 SYRINGE (ML) INJECTION AS NEEDED
OUTPATIENT
Start: 2025-06-03

## 2025-06-03 RX ORDER — MIDAZOLAM HYDROCHLORIDE 1 MG/ML
2-2.5 INJECTION, SOLUTION INTRAMUSCULAR; INTRAVENOUS ONCE AS NEEDED
OUTPATIENT
Start: 2025-06-03

## 2025-06-03 RX ORDER — SODIUM CHLORIDE 9 MG/ML
40 INJECTION, SOLUTION INTRAVENOUS AS NEEDED
OUTPATIENT
Start: 2025-06-03

## 2025-06-03 RX ORDER — NALOXONE HCL 0.4 MG/ML
0.4 VIAL (ML) INJECTION ONCE AS NEEDED
OUTPATIENT
Start: 2025-06-03

## 2025-06-03 RX ORDER — FLUMAZENIL 0.1 MG/ML
0.5 INJECTION INTRAVENOUS ONCE AS NEEDED
OUTPATIENT
Start: 2025-06-03

## 2025-06-03 RX ORDER — FENTANYL CITRATE 50 UG/ML
50-100 INJECTION, SOLUTION INTRAMUSCULAR; INTRAVENOUS ONCE AS NEEDED
Refills: 0 | OUTPATIENT
Start: 2025-06-03

## 2025-06-09 DIAGNOSIS — I49.3 PVC'S (PREMATURE VENTRICULAR CONTRACTIONS): Primary | ICD-10-CM

## 2025-06-09 DIAGNOSIS — I45.5 SINUS PAUSE: ICD-10-CM

## 2025-06-09 DIAGNOSIS — R94.31 ABNORMAL ELECTROCARDIOGRAM (ECG) (EKG): ICD-10-CM

## 2025-06-09 DIAGNOSIS — I34.0 NONRHEUMATIC MITRAL VALVE REGURGITATION: ICD-10-CM

## 2025-06-09 DIAGNOSIS — R00.2 PALPITATIONS: ICD-10-CM

## 2025-06-09 DIAGNOSIS — I49.1 PAC (PREMATURE ATRIAL CONTRACTION): ICD-10-CM

## 2025-06-11 ENCOUNTER — HOSPITAL ENCOUNTER (OUTPATIENT)
Dept: CARDIOLOGY | Facility: HOSPITAL | Age: 70
Discharge: HOME | End: 2025-06-11
Admitting: INTERNAL MEDICINE
Payer: MEDICARE

## 2025-06-11 DIAGNOSIS — I49.3 PVC'S (PREMATURE VENTRICULAR CONTRACTIONS): ICD-10-CM

## 2025-06-11 DIAGNOSIS — I45.5 SINUS PAUSE: ICD-10-CM

## 2025-06-11 DIAGNOSIS — I34.0 NONRHEUMATIC MITRAL VALVE REGURGITATION: ICD-10-CM

## 2025-06-11 DIAGNOSIS — R00.2 PALPITATIONS: ICD-10-CM

## 2025-06-11 DIAGNOSIS — I49.1 PAC (PREMATURE ATRIAL CONTRACTION): ICD-10-CM

## 2025-06-11 DIAGNOSIS — R94.31 ABNORMAL ELECTROCARDIOGRAM (ECG) (EKG): ICD-10-CM

## 2025-06-11 LAB
BH CV STRESS BP STAGE 1: NORMAL
BH CV STRESS DURATION MIN STAGE 1: 3
BH CV STRESS DURATION MIN STAGE 2: 1
BH CV STRESS DURATION SEC STAGE 1: 0
BH CV STRESS DURATION SEC STAGE 2: 49
BH CV STRESS GRADE STAGE 1: 10
BH CV STRESS GRADE STAGE 2: 12
BH CV STRESS HR STAGE 1: 121
BH CV STRESS HR STAGE 2: 139
BH CV STRESS METS STAGE 1: 5
BH CV STRESS METS STAGE 2: 7.5
BH CV STRESS O2 STAGE 1: 96
BH CV STRESS O2 STAGE 2: 96
BH CV STRESS PROTOCOL 1: NORMAL
BH CV STRESS RECOVERY BP: NORMAL MMHG
BH CV STRESS RECOVERY HR: 75 BPM
BH CV STRESS RECOVERY O2: 96 %
BH CV STRESS SPEED STAGE 1: 1.7
BH CV STRESS SPEED STAGE 2: 2.5
BH CV STRESS STAGE 1: 1
BH CV STRESS STAGE 2: 2
MAXIMAL PREDICTED HEART RATE: 149 BPM
PERCENT MAX PREDICTED HR: 93.29 %
STRESS BASELINE BP: NORMAL MMHG
STRESS BASELINE HR: 64 BPM
STRESS O2 SAT REST: 96 %
STRESS PERCENT HR: 110 %
STRESS POST ESTIMATED WORKLOAD: 6.7 METS
STRESS POST EXERCISE DUR MIN: 4 MIN
STRESS POST EXERCISE DUR SEC: 49 SEC
STRESS POST O2 SAT PEAK: 96 %
STRESS POST PEAK BP: NORMAL MMHG
STRESS POST PEAK HR: 139 BPM
STRESS TARGET HR: 127 BPM

## 2025-06-11 PROCEDURE — 93017 CV STRESS TEST TRACING ONLY: CPT

## 2025-06-11 RX ORDER — METOPROLOL SUCCINATE 25 MG/1
25 TABLET, EXTENDED RELEASE ORAL DAILY
Qty: 30 TABLET | Refills: 2 | Status: SHIPPED | OUTPATIENT
Start: 2025-06-11

## 2025-07-07 ENCOUNTER — TELEPHONE (OUTPATIENT)
Dept: CARDIOLOGY | Facility: CLINIC | Age: 70
End: 2025-07-07
Payer: MEDICARE

## 2025-07-07 RX ORDER — NYSTATIN 100000 [USP'U]/G
POWDER TOPICAL
COMMUNITY
Start: 2025-06-25

## 2025-07-07 RX ORDER — CLOTRIMAZOLE 1 G/ML
SOLUTION TOPICAL
COMMUNITY
Start: 2025-06-26

## 2025-07-07 RX ORDER — KETOCONAZOLE 20 MG/G
CREAM TOPICAL SEE ADMIN INSTRUCTIONS
COMMUNITY
Start: 2025-06-24

## 2025-07-07 NOTE — TELEPHONE ENCOUNTER
Medications reconciled successfully. Patient also stated that her issue was getting worse and would leave a note.

## 2025-07-10 ENCOUNTER — PATIENT ROUNDING (BHMG ONLY) (OUTPATIENT)
Dept: CARDIOLOGY | Facility: CLINIC | Age: 70
End: 2025-07-10
Payer: MEDICARE

## 2025-07-10 ENCOUNTER — OFFICE VISIT (OUTPATIENT)
Dept: CARDIOLOGY | Facility: CLINIC | Age: 70
End: 2025-07-10
Payer: MEDICARE

## 2025-07-10 VITALS
WEIGHT: 145.4 LBS | HEART RATE: 65 BPM | DIASTOLIC BLOOD PRESSURE: 74 MMHG | HEIGHT: 66 IN | BODY MASS INDEX: 23.37 KG/M2 | OXYGEN SATURATION: 96 % | SYSTOLIC BLOOD PRESSURE: 122 MMHG

## 2025-07-10 DIAGNOSIS — I49.3 PVC'S (PREMATURE VENTRICULAR CONTRACTIONS): Primary | ICD-10-CM

## 2025-07-10 DIAGNOSIS — I45.5 SINUS PAUSE: ICD-10-CM

## 2025-07-10 DIAGNOSIS — I49.3 PVC'S (PREMATURE VENTRICULAR CONTRACTIONS): Chronic | ICD-10-CM

## 2025-07-10 DIAGNOSIS — I10 PRIMARY HYPERTENSION: Chronic | ICD-10-CM

## 2025-07-10 DIAGNOSIS — I45.5 SINUS PAUSE: Primary | Chronic | ICD-10-CM

## 2025-07-10 DIAGNOSIS — I34.0 NONRHEUMATIC MITRAL VALVE REGURGITATION: Chronic | ICD-10-CM

## 2025-07-10 NOTE — PROGRESS NOTES
July 10, 2025    Hello, may I speak with Sabiha Gonzalez?    My name is JW.       I am  with [facility]  [address]  [address]  [place] [address] [phone].    Before we get started may I verify your date of birth? 1955    I am calling to officially welcome you to our practice and ask about your recent visit. Is this a good time to talk? yes    Tell me about your visit with us. What things went well?  EVERYTHING- EVERYONE WAS REALLY PLEASANT AND THROUGH, I REALLY LIKED DR. NAIK       We're always looking for ways to make our patients' experiences even better. Do you have recommendations on ways we may improve?  no    Overall were you satisfied with your first visit to our practice? yes       I appreciate you taking the time to speak with me today. Is there anything else I can do for you? no      Thank you, and have a great day.

## 2025-07-10 NOTE — H&P (VIEW-ONLY)
Electrophysiology Clinic Consult     Sabiha Gonzalez  [id number]  1955    Referring Provider: Oliverio Jones,*   PCP: Aparna Wren PA-C  [facility] [address] [address]    Chief Complaint   Patient presents with    PVC's (premature ventricular contractions)     Results from Holter showed a pause and same with her home monitor. Pt states Flecainide and Metoprolol no longer control her arrhythmia.  Pt has some dizziness when she having the PVC's     Problem List  PVCs  Echo, 3/11/2025: EF 65%, mild TR, mild to moderate AI R, trace MR  Monitor, 5/14/2025: PVC less than 1% burden, sinus pauses with longest being 3.2 seconds occurred at 12:18 PM patient was symptomatic, SVT/AT noted  MPS, 6/11/2025: No ischemic changes, normal inotropic response, frequent PVCs noted during recovery  Breast cancer  2010  Treated with Cytoxan  HER2 positive  Right mastectomy, no radiation    History of Present Illness  Sabiha Gonzalez is a 69 y.o. female who presents to my electrophysiology clinic for evaluation of sinus pauses.  Event monitor showed symptomatic pauses which lasted about 3.2 seconds.  She did not pass out but she had an endorsed having very severe lightheadedness.  Exercise stress test showed normal chronotropic response.  Referred to me for further evaluation and management.    Review of Systems   Constitutional:  Positive for fatigue. Negative for activity change and fever.   Respiratory:  Negative for chest tightness and shortness of breath.    Cardiovascular:  Negative for chest pain, palpitations and leg swelling.   Gastrointestinal:  Negative for constipation and diarrhea.   Genitourinary:  Negative for decreased urine volume and difficulty urinating.   Skin:  Negative for wound.   Neurological:  Positive for weakness and light-headedness. Negative for dizziness and syncope.   Psychiatric/Behavioral:  Negative for suicidal ideas.        Outpatient Medications Marked as  "Taking for the 7/10/25 encounter (Office Visit) with Behzad Charles MD   Medication Sig Dispense Refill    acyclovir (ZOVIRAX) 5 % ointment Space applications every 3 hours. (Patient taking differently: Apply 1 Application topically to the appropriate area as directed As Needed.)      clotrimazole (LOTRIMIN) 1 % external solution APPLY FEW DROPS ON NAIL ONE TO TWO TIMES A DAY      Collagen Hydrolysate powder Use Daily. OTC, mix with coffee in the morning      flecainide (TAMBOCOR) 50 MG tablet Take 1 tablet by mouth 2 (Two) Times a Day. 180 tablet 1    ketoconazole (NIZORAL) 2 % cream Apply  topically to the appropriate area as directed See Admin Instructions. Apply topically to the affected area twice daily (Patient taking differently: Apply  topically to the appropriate area as directed See Admin Instructions. As needed)      metoprolol succinate XL (TOPROL-XL) 25 MG 24 hr tablet Take 1 tablet by mouth Daily. 30 tablet 2    Multiple Vitamin (MULTI VITAMIN) tablet Take  by mouth.      nystatin (MYCOSTATIN) 328178 UNIT/GM powder APPLY TO TO THE AFFECTED AREA DAILY AFTER SHOWERING AND DRY AREA WELL (Patient taking differently: As needed)         Physical Exam  Vitals:    07/10/25 1027   BP: 122/74   BP Location: Left arm   Patient Position: Sitting   Cuff Size: Adult   Pulse: 65   SpO2: 96%   Weight: 66 kg (145 lb 6.4 oz)   Height: 167.6 cm (66\")     Body mass index is 23.47 kg/m².    Vitals and nursing note reviewed.   Constitutional:       Appearance: Healthy appearance.   HENT:      Head: Normocephalic and atraumatic.      Nose: Nose normal.   Neck:      Vascular: No JVD.   Pulmonary:      Effort: Pulmonary effort is normal.      Breath sounds: Normal breath sounds.   Cardiovascular:      PMI at left midclavicular line. Normal rate. Regular rhythm. Normal S1. Normal S2.       Murmurs: There is no murmur.      No gallop.    Edema:     Peripheral edema absent.   Skin:     General: Skin is warm and dry.   Neurological: " "     Mental Status: Oriented to person, place and time.   Psychiatric:         Behavior: Behavior normal.          Diagnostic Data  Procedures    Sinus rhythm 66 bpm   QRS 98 QTc 413    Lab Results   Component Value Date    GLUCOSE 92 05/14/2025    CALCIUM 9.5 05/14/2025     05/14/2025    K 4.1 05/14/2025    CO2 23.7 05/14/2025     05/14/2025    BUN 15 05/14/2025    CREATININE 0.88 05/14/2025    EGFRIFNONA 96 07/22/2020    BCR 17.0 05/14/2025    ANIONGAP 12.3 05/14/2025     Lab Results   Component Value Date    WBC 7.70 05/14/2025    HGB 12.6 05/14/2025    HCT 38.8 05/14/2025    MCV 83.8 05/14/2025     05/14/2025     No results found for: \"INR\", \"PROTIME\"  Lab Results   Component Value Date    TSH 2.760 04/22/2024         I personally viewed and interpreted the patient's EKG/Telemetry/lab data    Sabiha Gonzalez  reports that she quit smoking about 41 years ago. Her smoking use included cigarettes. She started smoking about 51 years ago. She has a 5 pack-year smoking history. She has never been exposed to tobacco smoke. She has never used smokeless tobacco. I have educated her on the risk of diseases from using tobacco products such as cancer, COPD, and heart disease.     I spent 3  minutes counseling the patient.    ACP discussion was declined by the patient. Patient has an advance directive in EMR which is still valid.     Assessment and Plan   Diagnoses and all orders for this visit:    1. PVC's (premature ventricular contractions) (Primary)    2. Primary hypertension    3. Nonrheumatic mitral valve regurgitation        Sinus pause  - symptomatic sinus pause (3.2 seconds pause)  - discuss further monitoring vs pacemaker implantation and she would like to proceed with ppm implantation.   - After extensive conversation regarding risks, benefits, or alternatives to the therapy (patient voiced understanding of risks, benefits, and alternative), patient elected to proceed with the LL PPM " procedure    - LL PPM with Atrial AVEIR   - femoral approach    PVC  - minimal PVC burden on her prior PRINCESS  - night time occurrence when laying her left side  - after her AVEIR implantation will increase her metoprolol back up to 50 and in the future consider increasing flecainide to 100      Follow Up  No follow-ups on file.      Thank you for allowing me to participate in the care of your patient. Please to not hesitate to contact me with additional questions or concerns.

## 2025-07-10 NOTE — PROGRESS NOTES
Electrophysiology Clinic Consult     Sabiha Gonzalez  [id number]  1955    Referring Provider: Oliverio Jones,*   PCP: Aparna Wren PA-C  [facility] [address]    Chief Complaint   Patient presents with    PVC's (premature ventricular contractions)     Results from Holter showed a pause and same with her home monitor. Pt states Flecainide and Metoprolol no longer control her arrhythmia.  Pt has some dizziness when she having the PVC's     Problem List  PVCs  Echo, 3/11/2025: EF 65%, mild TR, mild to moderate AI R, trace MR  Monitor, 5/14/2025: PVC less than 1% burden, sinus pauses with longest being 3.2 seconds occurred at 12:18 PM patient was symptomatic, SVT/AT noted  MPS, 6/11/2025: No ischemic changes, normal inotropic response, frequent PVCs noted during recovery  Breast cancer  2010  Treated with Cytoxan  HER2 positive  Right mastectomy, no radiation    History of Present Illness  Sabiha Gonzalez is a 69 y.o. female who presents to my electrophysiology clinic for evaluation of sinus pauses.  Event monitor showed symptomatic pauses which lasted about 3.2 seconds.  She did not pass out but she had an endorsed having very severe lightheadedness.  Exercise stress test showed normal chronotropic response.  Referred to me for further evaluation and management.    Review of Systems   Constitutional:  Positive for fatigue. Negative for activity change and fever.   Respiratory:  Negative for chest tightness and shortness of breath.    Cardiovascular:  Negative for chest pain, palpitations and leg swelling.   Gastrointestinal:  Negative for constipation and diarrhea.   Genitourinary:  Negative for decreased urine volume and difficulty urinating.   Skin:  Negative for wound.   Neurological:  Positive for weakness and light-headedness. Negative for dizziness and syncope.   Psychiatric/Behavioral:  Negative for suicidal ideas.        Outpatient Medications Marked as  "Taking for the 7/10/25 encounter (Office Visit) with Behzad Charles MD   Medication Sig Dispense Refill    acyclovir (ZOVIRAX) 5 % ointment Space applications every 3 hours. (Patient taking differently: Apply 1 Application topically to the appropriate area as directed As Needed.)      clotrimazole (LOTRIMIN) 1 % external solution APPLY FEW DROPS ON NAIL ONE TO TWO TIMES A DAY      Collagen Hydrolysate powder Use Daily. OTC, mix with coffee in the morning      flecainide (TAMBOCOR) 50 MG tablet Take 1 tablet by mouth 2 (Two) Times a Day. 180 tablet 1    ketoconazole (NIZORAL) 2 % cream Apply  topically to the appropriate area as directed See Admin Instructions. Apply topically to the affected area twice daily (Patient taking differently: Apply  topically to the appropriate area as directed See Admin Instructions. As needed)      metoprolol succinate XL (TOPROL-XL) 25 MG 24 hr tablet Take 1 tablet by mouth Daily. 30 tablet 2    Multiple Vitamin (MULTI VITAMIN) tablet Take  by mouth.      nystatin (MYCOSTATIN) 056031 UNIT/GM powder APPLY TO TO THE AFFECTED AREA DAILY AFTER SHOWERING AND DRY AREA WELL (Patient taking differently: As needed)         Physical Exam  Vitals:    07/10/25 1027   BP: 122/74   BP Location: Left arm   Patient Position: Sitting   Cuff Size: Adult   Pulse: 65   SpO2: 96%   Weight: 66 kg (145 lb 6.4 oz)   Height: 167.6 cm (66\")     Body mass index is 23.47 kg/m².    Vitals and nursing note reviewed.   Constitutional:       Appearance: Healthy appearance.   HENT:      Head: Normocephalic and atraumatic.      Nose: Nose normal.   Neck:      Vascular: No JVD.   Pulmonary:      Effort: Pulmonary effort is normal.      Breath sounds: Normal breath sounds.   Cardiovascular:      PMI at left midclavicular line. Normal rate. Regular rhythm. Normal S1. Normal S2.       Murmurs: There is no murmur.      No gallop.    Edema:     Peripheral edema absent.   Skin:     General: Skin is warm and dry.   Neurological: " "     Mental Status: Oriented to person, place and time.   Psychiatric:         Behavior: Behavior normal.          Diagnostic Data  Procedures    Sinus rhythm 66 bpm   QRS 98 QTc 413    Lab Results   Component Value Date    GLUCOSE 92 05/14/2025    CALCIUM 9.5 05/14/2025     05/14/2025    K 4.1 05/14/2025    CO2 23.7 05/14/2025     05/14/2025    BUN 15 05/14/2025    CREATININE 0.88 05/14/2025    EGFRIFNONA 96 07/22/2020    BCR 17.0 05/14/2025    ANIONGAP 12.3 05/14/2025     Lab Results   Component Value Date    WBC 7.70 05/14/2025    HGB 12.6 05/14/2025    HCT 38.8 05/14/2025    MCV 83.8 05/14/2025     05/14/2025     No results found for: \"INR\", \"PROTIME\"  Lab Results   Component Value Date    TSH 2.760 04/22/2024         I personally viewed and interpreted the patient's EKG/Telemetry/lab data    Sabiha Gonzalez  reports that she quit smoking about 41 years ago. Her smoking use included cigarettes. She started smoking about 51 years ago. She has a 5 pack-year smoking history. She has never been exposed to tobacco smoke. She has never used smokeless tobacco. I have educated her on the risk of diseases from using tobacco products such as cancer, COPD, and heart disease.     I spent 3  minutes counseling the patient.    ACP discussion was declined by the patient. Patient has an advance directive in EMR which is still valid.     Assessment and Plan   Diagnoses and all orders for this visit:    1. PVC's (premature ventricular contractions) (Primary)    2. Primary hypertension    3. Nonrheumatic mitral valve regurgitation        Sinus pause  - symptomatic sinus pause (3.2 seconds pause)  - discuss further monitoring vs pacemaker implantation and she would like to proceed with ppm implantation.   - After extensive conversation regarding risks, benefits, or alternatives to the therapy (patient voiced understanding of risks, benefits, and alternative), patient elected to proceed with the LL PPM " procedure    - LL PPM with Atrial AVEIR   - femoral approach    PVC  - minimal PVC burden on her prior PRINCESS  - night time occurrence when laying her left side  - after her AVEIR implantation will increase her metoprolol back up to 50 and in the future consider increasing flecainide to 100      Follow Up  No follow-ups on file.      Thank you for allowing me to participate in the care of your patient. Please to not hesitate to contact me with additional questions or concerns.

## 2025-07-28 ENCOUNTER — APPOINTMENT (OUTPATIENT)
Dept: GENERAL RADIOLOGY | Facility: HOSPITAL | Age: 70
End: 2025-07-28
Payer: MEDICARE

## 2025-07-28 ENCOUNTER — HOSPITAL ENCOUNTER (OUTPATIENT)
Facility: HOSPITAL | Age: 70
Setting detail: HOSPITAL OUTPATIENT SURGERY
Discharge: HOME | End: 2025-07-28
Attending: INTERNAL MEDICINE | Admitting: INTERNAL MEDICINE
Payer: MEDICARE

## 2025-07-28 VITALS
DIASTOLIC BLOOD PRESSURE: 72 MMHG | HEART RATE: 65 BPM | WEIGHT: 142.86 LBS | OXYGEN SATURATION: 97 % | HEIGHT: 66 IN | BODY MASS INDEX: 22.96 KG/M2 | TEMPERATURE: 97.4 F | RESPIRATION RATE: 10 BRPM | SYSTOLIC BLOOD PRESSURE: 157 MMHG

## 2025-07-28 DIAGNOSIS — I49.3 PVC'S (PREMATURE VENTRICULAR CONTRACTIONS): ICD-10-CM

## 2025-07-28 DIAGNOSIS — I45.5 SINUS PAUSE: ICD-10-CM

## 2025-07-28 LAB
ANION GAP SERPL CALCULATED.3IONS-SCNC: 8 MMOL/L (ref 5–15)
BUN SERPL-MCNC: 10.8 MG/DL (ref 8–23)
BUN/CREAT SERPL: 16.1 (ref 7–25)
CALCIUM SPEC-SCNC: 9.3 MG/DL (ref 8.6–10.5)
CHLORIDE SERPL-SCNC: 103 MMOL/L (ref 98–107)
CO2 SERPL-SCNC: 26 MMOL/L (ref 22–29)
CREAT SERPL-MCNC: 0.67 MG/DL (ref 0.57–1)
DEPRECATED RDW RBC AUTO: 40.3 FL (ref 37–54)
EGFRCR SERPLBLD CKD-EPI 2021: 93.6 ML/MIN/1.73
ERYTHROCYTE [DISTWIDTH] IN BLOOD BY AUTOMATED COUNT: 13.4 % (ref 12.3–15.4)
GLUCOSE SERPL-MCNC: 92 MG/DL (ref 65–99)
HCT VFR BLD AUTO: 38 % (ref 34–46.6)
HGB BLD-MCNC: 12 G/DL (ref 12–15.9)
MCH RBC QN AUTO: 26.3 PG (ref 26.6–33)
MCHC RBC AUTO-ENTMCNC: 31.6 G/DL (ref 31.5–35.7)
MCV RBC AUTO: 83.2 FL (ref 79–97)
PLATELET # BLD AUTO: 253 10*3/MM3 (ref 140–450)
PMV BLD AUTO: 8.8 FL (ref 6–12)
POTASSIUM SERPL-SCNC: 3.8 MMOL/L (ref 3.5–5.2)
RBC # BLD AUTO: 4.57 10*6/MM3 (ref 3.77–5.28)
SODIUM SERPL-SCNC: 137 MMOL/L (ref 136–145)
WBC NRBC COR # BLD AUTO: 7.63 10*3/MM3 (ref 3.4–10.8)

## 2025-07-28 PROCEDURE — 25010000002 MIDAZOLAM PER 1 MG: Performed by: INTERNAL MEDICINE

## 2025-07-28 PROCEDURE — 25510000001 IOPAMIDOL PER 1 ML: Performed by: INTERNAL MEDICINE

## 2025-07-28 PROCEDURE — C1887 CATHETER, GUIDING: HCPCS | Performed by: INTERNAL MEDICINE

## 2025-07-28 PROCEDURE — 99152 MOD SED SAME PHYS/QHP 5/>YRS: CPT | Performed by: INTERNAL MEDICINE

## 2025-07-28 PROCEDURE — 85027 COMPLETE CBC AUTOMATED: CPT | Performed by: INTERNAL MEDICINE

## 2025-07-28 PROCEDURE — C1760 CLOSURE DEV, VASC: HCPCS | Performed by: INTERNAL MEDICINE

## 2025-07-28 PROCEDURE — 25010000002 LIDOCAINE 1 % SOLUTION: Performed by: INTERNAL MEDICINE

## 2025-07-28 PROCEDURE — 25010000002 ONDANSETRON PER 1 MG: Performed by: INTERNAL MEDICINE

## 2025-07-28 PROCEDURE — 0823T TCAT INS 1CHMBR LDLS PM RA: CPT | Performed by: INTERNAL MEDICINE

## 2025-07-28 PROCEDURE — 25010000002 BUPIVACAINE 0.5 % SOLUTION: Performed by: INTERNAL MEDICINE

## 2025-07-28 PROCEDURE — 25010000002 FENTANYL CITRATE (PF) 50 MCG/ML SOLUTION: Performed by: INTERNAL MEDICINE

## 2025-07-28 PROCEDURE — C1769 GUIDE WIRE: HCPCS | Performed by: INTERNAL MEDICINE

## 2025-07-28 PROCEDURE — C1786 PMKR, SINGLE, RATE-RESP: HCPCS | Performed by: INTERNAL MEDICINE

## 2025-07-28 PROCEDURE — 80048 BASIC METABOLIC PNL TOTAL CA: CPT

## 2025-07-28 PROCEDURE — C1894 INTRO/SHEATH, NON-LASER: HCPCS | Performed by: INTERNAL MEDICINE

## 2025-07-28 PROCEDURE — 36415 COLL VENOUS BLD VENIPUNCTURE: CPT

## 2025-07-28 PROCEDURE — 99153 MOD SED SAME PHYS/QHP EA: CPT | Performed by: INTERNAL MEDICINE

## 2025-07-28 PROCEDURE — 71046 X-RAY EXAM CHEST 2 VIEWS: CPT

## 2025-07-28 PROCEDURE — 25010000002 CEFAZOLIN PER 500 MG

## 2025-07-28 PROCEDURE — 93005 ELECTROCARDIOGRAM TRACING: CPT | Performed by: INTERNAL MEDICINE

## 2025-07-28 DEVICE — GEN PM RT/ATRIAL AVEIR LDLSS 32.2X6.5MM: Type: IMPLANTABLE DEVICE | Status: FUNCTIONAL

## 2025-07-28 RX ORDER — ACETAMINOPHEN 325 MG/1
650 TABLET ORAL EVERY 4 HOURS PRN
Status: DISCONTINUED | OUTPATIENT
Start: 2025-07-28 | End: 2025-07-28 | Stop reason: HOSPADM

## 2025-07-28 RX ORDER — SODIUM CHLORIDE 0.9 % (FLUSH) 0.9 %
10 SYRINGE (ML) INJECTION AS NEEDED
Status: DISCONTINUED | OUTPATIENT
Start: 2025-07-28 | End: 2025-07-28 | Stop reason: HOSPADM

## 2025-07-28 RX ORDER — MIDAZOLAM HYDROCHLORIDE 1 MG/ML
INJECTION, SOLUTION INTRAMUSCULAR; INTRAVENOUS
Status: DISCONTINUED | OUTPATIENT
Start: 2025-07-28 | End: 2025-07-28 | Stop reason: HOSPADM

## 2025-07-28 RX ORDER — SODIUM CHLORIDE 9 MG/ML
40 INJECTION, SOLUTION INTRAVENOUS AS NEEDED
Status: DISCONTINUED | OUTPATIENT
Start: 2025-07-28 | End: 2025-07-28 | Stop reason: HOSPADM

## 2025-07-28 RX ORDER — ONDANSETRON 2 MG/ML
INJECTION INTRAMUSCULAR; INTRAVENOUS
Status: DISCONTINUED | OUTPATIENT
Start: 2025-07-28 | End: 2025-07-28 | Stop reason: HOSPADM

## 2025-07-28 RX ORDER — NITROGLYCERIN 0.4 MG/1
0.4 TABLET SUBLINGUAL
Status: DISCONTINUED | OUTPATIENT
Start: 2025-07-28 | End: 2025-07-28 | Stop reason: HOSPADM

## 2025-07-28 RX ORDER — SODIUM CHLORIDE 0.9 % (FLUSH) 0.9 %
1-10 SYRINGE (ML) INJECTION AS NEEDED
Status: DISCONTINUED | OUTPATIENT
Start: 2025-07-28 | End: 2025-07-28 | Stop reason: HOSPADM

## 2025-07-28 RX ORDER — FENTANYL CITRATE 50 UG/ML
INJECTION, SOLUTION INTRAMUSCULAR; INTRAVENOUS
Status: DISCONTINUED | OUTPATIENT
Start: 2025-07-28 | End: 2025-07-28 | Stop reason: HOSPADM

## 2025-07-28 RX ORDER — LIDOCAINE HYDROCHLORIDE 10 MG/ML
INJECTION, SOLUTION INFILTRATION; PERINEURAL
Status: DISCONTINUED | OUTPATIENT
Start: 2025-07-28 | End: 2025-07-28 | Stop reason: HOSPADM

## 2025-07-28 RX ORDER — ONDANSETRON 2 MG/ML
4 INJECTION INTRAMUSCULAR; INTRAVENOUS EVERY 6 HOURS PRN
Status: DISCONTINUED | OUTPATIENT
Start: 2025-07-28 | End: 2025-07-28 | Stop reason: HOSPADM

## 2025-07-28 RX ORDER — SODIUM CHLORIDE 0.9 % (FLUSH) 0.9 %
10 SYRINGE (ML) INJECTION EVERY 12 HOURS SCHEDULED
Status: DISCONTINUED | OUTPATIENT
Start: 2025-07-28 | End: 2025-07-28 | Stop reason: HOSPADM

## 2025-07-28 RX ORDER — ETOMIDATE 2 MG/ML
INJECTION INTRAVENOUS
Status: DISCONTINUED | OUTPATIENT
Start: 2025-07-28 | End: 2025-07-28 | Stop reason: HOSPADM

## 2025-07-28 RX ORDER — IOPAMIDOL 755 MG/ML
INJECTION, SOLUTION INTRAVASCULAR
Status: DISCONTINUED | OUTPATIENT
Start: 2025-07-28 | End: 2025-07-28 | Stop reason: HOSPADM

## 2025-07-28 RX ORDER — BUPIVACAINE HYDROCHLORIDE 5 MG/ML
INJECTION, SOLUTION PERINEURAL
Status: DISCONTINUED | OUTPATIENT
Start: 2025-07-28 | End: 2025-07-28 | Stop reason: HOSPADM

## 2025-07-28 RX ORDER — METOPROLOL SUCCINATE 50 MG/1
50 TABLET, EXTENDED RELEASE ORAL DAILY
Qty: 90 TABLET | Refills: 0 | Status: SHIPPED | OUTPATIENT
Start: 2025-07-28 | End: 2025-10-26

## 2025-07-28 RX ADMIN — SODIUM CHLORIDE 2000 MG: 900 INJECTION INTRAVENOUS at 16:10

## 2025-07-28 NOTE — Clinical Note
Hemostasis started on the right femoral vein. Perclose was used in achieving hemostasis. Closure device deployed in the vessel. Hemostasis achieved successfully. Closure device additional comment: AMY X2

## 2025-07-28 NOTE — INTERVAL H&P NOTE
"  H&P reviewed. The patient was examined and there are no changes to the H&P.      Vitals:    07/28/25 1318 07/28/25 1328   BP:  143/77   BP Location:  Left arm   Patient Position:  Lying   Pulse:  58   Resp:  18   Temp:  97.4 °F (36.3 °C)   TempSrc:  Temporal   SpO2:  99%   Weight: 64.8 kg (142 lb 13.7 oz)    Height: 167.6 cm (66\")         Labs pending.       Patient is here today for Atrial leadless pacemaker via femoral approach. Patient will received 2 gm ancef for surgical prophylaxis.  Procedure, risks, and alternatives have been discussed and patient is agreeable to proceed.  Further recommendations to follow.     Electronically signed by JAY Hernandez, 07/28/25, 1:42 PM EDT.    "

## 2025-07-29 ENCOUNTER — CALL CENTER PROGRAMS (OUTPATIENT)
Dept: CALL CENTER | Facility: HOSPITAL | Age: 70
End: 2025-07-29
Payer: MEDICARE

## 2025-07-29 LAB
QT INTERVAL: 440 MS
QTC INTERVAL: 453 MS

## 2025-07-29 NOTE — OUTREACH NOTE
PCI/Device Survey      Flowsheet Row Responses   Facility patient discharged from? [facility]   Procedure date 07/28/25   Procedure (if device, specify in description) Device   Device Description Leadless pacemaker with Atrial AVEIR   Performing MD Other (annotate)  [Dr. Behzad Charles]   Attempt successful? Yes   Call start time 0925   Call end time 0927   Has the patient had any of the following symptoms since discharge? --  [no symptoms noted]   Nursing interventions Patient education provided   Is the patient taking prescribed medications: N/A   Nursing intervention Reminded to continue to take prescribed medications, Nurse provided patient education   Does the patient have any of the following symptoms related to the cath/surgical site? --  [no symptoms noted]   Nursing intervention Patient education provided   Does the patient have an appointment scheduled with the cardiologist? Yes   Appointment comments Hospital follow up appt with cardiologist Dr. Charles on 10/30   If the patient is a current smoker, are they able to teach back resources for cessation? Not a smoker   Did the patient feel prepared to go home on the same day as the procedure? Yes   Is the patient satisfied with the same day discharge process? Yes   PCI/Device call completed Yes   Wrap up additional comments Doing well, all questions addressed.            Maria DOTY - Registered Nurse

## 2025-07-30 ENCOUNTER — TELEPHONE (OUTPATIENT)
Dept: CARDIOLOGY | Facility: CLINIC | Age: 70
End: 2025-07-30
Payer: MEDICARE

## 2025-08-11 ENCOUNTER — TELEPHONE (OUTPATIENT)
Dept: CARDIOLOGY | Facility: CLINIC | Age: 70
End: 2025-08-11
Payer: MEDICARE

## 2025-08-13 ENCOUNTER — TELEPHONE (OUTPATIENT)
Dept: CARDIOLOGY | Facility: CLINIC | Age: 70
End: 2025-08-13
Payer: MEDICARE

## 2025-08-13 DIAGNOSIS — I49.3 PVC'S (PREMATURE VENTRICULAR CONTRACTIONS): Primary | ICD-10-CM

## 2025-08-13 RX ORDER — FLECAINIDE ACETATE 100 MG/1
100 TABLET ORAL 2 TIMES DAILY
Qty: 60 TABLET | Refills: 5 | Status: SHIPPED | OUTPATIENT
Start: 2025-08-13

## 2025-08-18 ENCOUNTER — CLINICAL SUPPORT (OUTPATIENT)
Dept: CARDIOLOGY | Facility: CLINIC | Age: 70
End: 2025-08-18
Payer: MEDICARE

## 2025-08-18 DIAGNOSIS — I49.3 PVC'S (PREMATURE VENTRICULAR CONTRACTIONS): Primary | ICD-10-CM

## 2025-08-18 PROCEDURE — 93000 ELECTROCARDIOGRAM COMPLETE: CPT | Performed by: INTERNAL MEDICINE

## (undated) DEVICE — CANN NASL CAPNOFLEX SMPL CO2/O2 W/O2/DEL A/

## (undated) DEVICE — DIL VESL .038 12F 19CM

## (undated) DEVICE — PERCLOSE™ PROSTYLE™ SUTURE-MEDIATED CLOSURE AND REPAIR SYSTEM: Brand: PERCLOSE™ PROSTYLE™

## (undated) DEVICE — Device: Brand: MEDEX

## (undated) DEVICE — ST INF PRI SMRTSTE 20DRP 2VLV 24ML 117

## (undated) DEVICE — ST EXT IV SMRTSTE 2VLV FIX M LL 6ML 41

## (undated) DEVICE — INTRO SHEATH ENGAGE W/50 GW .038 8F12

## (undated) DEVICE — INTRO PM AVEIR HC 25F 50CM

## (undated) DEVICE — DIL VESL .038 16F 19CM

## (undated) DEVICE — LEX ELECTRO PHYSIOLOGY: Brand: MEDLINE INDUSTRIES, INC.

## (undated) DEVICE — LIMB HOLDER, WRIST/ANKLE: Brand: DEROYAL

## (undated) DEVICE — GW XCHG AMPLTZ XSTIF PTFE CRV .035IN 3X180CM

## (undated) DEVICE — ADULT, W/LG. BACK PAD, RADIOTRANSPARENT ELEMENT AND LEAD WIRE COMPATIBLE W/: Brand: DEFIBRILLATION ELECTRODES

## (undated) DEVICE — DECANT BG O JET

## (undated) DEVICE — CATH DEL AVEIR 105CM